# Patient Record
Sex: FEMALE | Race: BLACK OR AFRICAN AMERICAN | NOT HISPANIC OR LATINO | Employment: FULL TIME | URBAN - METROPOLITAN AREA
[De-identification: names, ages, dates, MRNs, and addresses within clinical notes are randomized per-mention and may not be internally consistent; named-entity substitution may affect disease eponyms.]

---
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
## 2022-01-14 ENCOUNTER — HOSPITAL ENCOUNTER (EMERGENCY)
Facility: HOSPITAL | Age: 22
Discharge: HOME | End: 2022-01-15
Attending: EMERGENCY MEDICINE
Payer: COMMERCIAL

## 2022-01-14 ENCOUNTER — APPOINTMENT (EMERGENCY)
Dept: RADIOLOGY | Facility: HOSPITAL | Age: 22
End: 2022-01-14
Payer: COMMERCIAL

## 2022-01-14 DIAGNOSIS — N94.6 SEVERE MENSTRUAL CRAMPS: ICD-10-CM

## 2022-01-14 DIAGNOSIS — R10.30 LOWER ABDOMINAL PAIN: Primary | ICD-10-CM

## 2022-01-14 LAB
ANION GAP SERPL CALCULATED.3IONS-SCNC: 3 MMOL/L (ref 4–13)
BASOPHILS # BLD AUTO: 0.01 THOUSANDS/ΜL (ref 0–0.1)
BASOPHILS NFR BLD AUTO: 0 % (ref 0–1)
BUN SERPL-MCNC: 12 MG/DL (ref 5–25)
CALCIUM SERPL-MCNC: 9.3 MG/DL (ref 8.3–10.1)
CHLORIDE SERPL-SCNC: 108 MMOL/L (ref 100–108)
CO2 SERPL-SCNC: 28 MMOL/L (ref 21–32)
CREAT SERPL-MCNC: 0.89 MG/DL (ref 0.6–1.3)
EOSINOPHIL # BLD AUTO: 0.12 THOUSAND/ΜL (ref 0–0.61)
EOSINOPHIL NFR BLD AUTO: 2 % (ref 0–6)
ERYTHROCYTE [DISTWIDTH] IN BLOOD BY AUTOMATED COUNT: 14.6 % (ref 11.6–15.1)
EXT PREG TEST URINE: NEGATIVE
EXT. CONTROL ED NAV: NORMAL
GFR SERPL CREATININE-BSD FRML MDRD: 94 ML/MIN/1.73SQ M
GLUCOSE SERPL-MCNC: 104 MG/DL (ref 65–140)
HCT VFR BLD AUTO: 35.2 % (ref 34.8–46.1)
HGB BLD-MCNC: 11.2 G/DL (ref 11.5–15.4)
IMM GRANULOCYTES # BLD AUTO: 0.03 THOUSAND/UL (ref 0–0.2)
IMM GRANULOCYTES NFR BLD AUTO: 1 % (ref 0–2)
LYMPHOCYTES # BLD AUTO: 1.86 THOUSANDS/ΜL (ref 0.6–4.47)
LYMPHOCYTES NFR BLD AUTO: 33 % (ref 14–44)
MCH RBC QN AUTO: 26.1 PG (ref 26.8–34.3)
MCHC RBC AUTO-ENTMCNC: 31.8 G/DL (ref 31.4–37.4)
MCV RBC AUTO: 82 FL (ref 82–98)
MONOCYTES # BLD AUTO: 0.49 THOUSAND/ΜL (ref 0.17–1.22)
MONOCYTES NFR BLD AUTO: 9 % (ref 4–12)
NEUTROPHILS # BLD AUTO: 3.15 THOUSANDS/ΜL (ref 1.85–7.62)
NEUTS SEG NFR BLD AUTO: 55 % (ref 43–75)
NRBC BLD AUTO-RTO: 0 /100 WBCS
PLATELET # BLD AUTO: 277 THOUSANDS/UL (ref 149–390)
PMV BLD AUTO: 10.8 FL (ref 8.9–12.7)
POTASSIUM SERPL-SCNC: 3.9 MMOL/L (ref 3.5–5.3)
RBC # BLD AUTO: 4.29 MILLION/UL (ref 3.81–5.12)
SODIUM SERPL-SCNC: 139 MMOL/L (ref 136–145)
WBC # BLD AUTO: 5.66 THOUSAND/UL (ref 4.31–10.16)

## 2022-01-14 PROCEDURE — 74177 CT ABD & PELVIS W/CONTRAST: CPT

## 2022-01-14 PROCEDURE — 81025 URINE PREGNANCY TEST: CPT

## 2022-01-14 PROCEDURE — 99283 EMERGENCY DEPT VISIT LOW MDM: CPT | Performed by: EMERGENCY MEDICINE

## 2022-01-14 PROCEDURE — 85025 COMPLETE CBC W/AUTO DIFF WBC: CPT

## 2022-01-14 PROCEDURE — 99284 EMERGENCY DEPT VISIT MOD MDM: CPT

## 2022-01-14 PROCEDURE — 96374 THER/PROPH/DIAG INJ IV PUSH: CPT

## 2022-01-14 PROCEDURE — 36415 COLL VENOUS BLD VENIPUNCTURE: CPT

## 2022-01-14 PROCEDURE — 80048 BASIC METABOLIC PNL TOTAL CA: CPT

## 2022-01-14 PROCEDURE — G1004 CDSM NDSC: HCPCS

## 2022-01-14 RX ORDER — KETOROLAC TROMETHAMINE 30 MG/ML
15 INJECTION, SOLUTION INTRAMUSCULAR; INTRAVENOUS ONCE
Status: COMPLETED | OUTPATIENT
Start: 2022-01-14 | End: 2022-01-14

## 2022-01-14 RX ADMIN — KETOROLAC TROMETHAMINE 15 MG: 30 INJECTION, SOLUTION INTRAMUSCULAR at 23:13

## 2022-01-15 VITALS
SYSTOLIC BLOOD PRESSURE: 132 MMHG | TEMPERATURE: 97.9 F | WEIGHT: 260 LBS | RESPIRATION RATE: 16 BRPM | HEART RATE: 101 BPM | OXYGEN SATURATION: 98 % | DIASTOLIC BLOOD PRESSURE: 84 MMHG | BODY MASS INDEX: 46.06 KG/M2

## 2022-01-15 RX ADMIN — IOHEXOL 100 ML: 350 INJECTION, SOLUTION INTRAVENOUS at 00:50

## 2022-01-15 NOTE — ED PROVIDER NOTES
History  Chief Complaint   Patient presents with    Abdominal Pain     pt c/o abd pain and severe vaginal bleeding x3 days  24 yo F no significant PMHx present to the ED with lower abdominal pain and heavy vaginal bleeding for 3 days  Patient states that the pain came on suddenly on Wednesday afternoon  The pain is located bilaterally in both lower quadrants L more than R and in the suprapubic region  She states the pain feels like severe menstrual cramps  She has a history of significant menstrual cramps and irregular menstrual periods  She has been on OCPs for 5 years which have controlled her symptoms and so she has not experienced pain like this in around 5 years  She recently stopped taking OCPs 2 months ago due to a lapse in insurance  She is sexually active  The first day of her LMP prior to this episode of bleeding at 12/18/21 so she would be due for her next period now  She denies any history of STDs, no dysuria or urgency, no history of vaginal discharge  She has tried Tylenol and Advil for pain control without relief  She denies any fevers or chills, she endorses some nausea but no vomiting  No diarrhea or constipation, no back pain or flank pain  Prior to Admission Medications   Prescriptions Last Dose Informant Patient Reported? Taking? Viorele 0 15-0 02/0 01 MG (21/5) per tablet 1/14/2022 at Unknown time  No Yes   Sig: TAKE 1 TABLET BY MOUTH DAILY   benzoyl peroxide 5 % gel 1/14/2022 at Unknown time  Yes Yes   cetirizine (ZyrTEC) 10 mg tablet 1/14/2022 at Unknown time  Yes Yes   Sig: Take 10 mg by mouth      Facility-Administered Medications: None       Past Medical History:   Diagnosis Date    Seasonal allergic reaction        History reviewed  No pertinent surgical history  History reviewed  No pertinent family history  I have reviewed and agree with the history as documented      E-Cigarette/Vaping    E-Cigarette Use Never User      E-Cigarette/Vaping Substances    Nicotine No  THC No     CBD No     Flavoring No     Other No     Unknown No      Social History     Tobacco Use    Smoking status: Never Smoker    Smokeless tobacco: Never Used   Vaping Use    Vaping Use: Never used   Substance Use Topics    Alcohol use: Never    Drug use: Never        Review of Systems   Constitutional: Negative for chills and fever  HENT: Negative for ear pain and sore throat  Eyes: Negative for pain and visual disturbance  Respiratory: Negative for cough and shortness of breath  Cardiovascular: Negative for chest pain and palpitations  Gastrointestinal: Positive for abdominal pain and nausea  Negative for diarrhea and vomiting  Genitourinary: Positive for menstrual problem, pelvic pain and vaginal bleeding  Negative for dysuria, flank pain, frequency, hematuria and vaginal discharge  Musculoskeletal: Negative for arthralgias and back pain  Skin: Negative for color change and rash  Neurological: Negative for dizziness, seizures, syncope and headaches  Hematological: Negative for adenopathy  All other systems reviewed and are negative  Physical Exam  ED Triage Vitals [01/14/22 2239]   Temperature Pulse Respirations Blood Pressure SpO2   97 9 °F (36 6 °C) 79 16 129/79 99 %      Temp Source Heart Rate Source Patient Position - Orthostatic VS BP Location FiO2 (%)   Tympanic Monitor Sitting Left arm --      Pain Score       --             Orthostatic Vital Signs  Vitals:    01/14/22 2239 01/15/22 0043   BP: 129/79 132/84   Pulse: 79 101   Patient Position - Orthostatic VS: Sitting Lying       Physical Exam  Vitals and nursing note reviewed  Constitutional:       General: She is not in acute distress  Appearance: She is well-developed  She is obese  She is not ill-appearing or diaphoretic  HENT:      Head: Normocephalic and atraumatic  Eyes:      Conjunctiva/sclera: Conjunctivae normal    Cardiovascular:      Rate and Rhythm: Normal rate and regular rhythm  Heart sounds: No murmur heard  Pulmonary:      Effort: Pulmonary effort is normal  No respiratory distress  Breath sounds: Normal breath sounds  Abdominal:      Palpations: Abdomen is soft  Tenderness: There is abdominal tenderness in the right lower quadrant, suprapubic area and left lower quadrant  There is no right CVA tenderness, left CVA tenderness, guarding or rebound  Negative signs include Matthew's sign, Rovsing's sign and McBurney's sign  Musculoskeletal:      Cervical back: Neck supple  Skin:     General: Skin is warm and dry  Capillary Refill: Capillary refill takes less than 2 seconds  Neurological:      Mental Status: She is alert           ED Medications  Medications   ketorolac (TORADOL) injection 15 mg (15 mg Intravenous Given 1/14/22 2313)   iohexol (OMNIPAQUE) 350 MG/ML injection (SINGLE-DOSE) 100 mL (100 mL Intravenous Given 1/15/22 0050)       Diagnostic Studies  Results Reviewed     Procedure Component Value Units Date/Time    Basic metabolic panel [[id number]]  (Abnormal) Collected: 01/14/22 2313    Lab Status: Final result Specimen: Blood from Arm, Left Updated: 01/14/22 2338     Sodium 139 mmol/L      Potassium 3 9 mmol/L      Chloride 108 mmol/L      CO2 28 mmol/L      ANION GAP 3 mmol/L      BUN 12 mg/dL      Creatinine 0 89 mg/dL      Glucose 104 mg/dL      Calcium 9 3 mg/dL      eGFR 94 ml/min/1 73sq m     Narrative:      [place] guidelines for Chronic Kidney Disease (CKD):     Stage 1 with normal or high GFR (GFR > 90 mL/min/1 73 square meters)    Stage 2 Mild CKD (GFR = 60-89 mL/min/1 73 square meters)    Stage 3A Moderate CKD (GFR = 45-59 mL/min/1 73 square meters)    Stage 3B Moderate CKD (GFR = 30-44 mL/min/1 73 square meters)    Stage 4 Severe CKD (GFR = 15-29 mL/min/1 73 square meters)    Stage 5 End Stage CKD (GFR <15 mL/min/1 73 square meters)  Note: GFR calculation is accurate only with a steady state creatinine    CBC and differential [[id number]]  (Abnormal) Collected: 01/14/22 2313    Lab Status: Final result Specimen: Blood from Arm, Left Updated: 01/14/22 2326     WBC 5 66 Thousand/uL      RBC 4 29 Million/uL      Hemoglobin 11 2 g/dL      Hematocrit 35 2 %      MCV 82 fL      MCH 26 1 pg      MCHC 31 8 g/dL      RDW 14 6 %      MPV 10 8 fL      Platelets 643 Thousands/uL      nRBC 0 /100 WBCs      Neutrophils Relative 55 %      Immat GRANS % 1 %      Lymphocytes Relative 33 %      Monocytes Relative 9 %      Eosinophils Relative 2 %      Basophils Relative 0 %      Neutrophils Absolute 3 15 Thousands/µL      Immature Grans Absolute 0 03 Thousand/uL      Lymphocytes Absolute 1 86 Thousands/µL      Monocytes Absolute 0 49 Thousand/µL      Eosinophils Absolute 0 12 Thousand/µL      Basophils Absolute 0 01 Thousands/µL     POCT pregnancy, urine [[id number]]  (Normal) Resulted: 01/14/22 2250    Lab Status: Final result Updated: 01/14/22 2250     EXT PREG TEST UR (Ref: Negative) negative     Control valid                 CT abdomen pelvis with contrast   Final Result by Ovidio Shelton MD (01/15 0059)      Mild circumferential wall thickening of the urinary bladder which could represent underdistention or cystitis  Correlate with urinalysis  Workstation performed: [id number]               Procedures  Procedures      ED Course                                       MDM  Number of Diagnoses or Management Options  Lower abdominal pain  Severe menstrual cramps  Diagnosis management comments: 26 yo F no significant PMHx present to the ED with lower abdominal pain and heavy vaginal bleeding for 3 days  DDx: pregnancy, ectopic pregnancy, ovarian torsion, ovarian cyst rupture, menstrual cramping  Pregnancy test negative  Will obtain CT scan to rule-out ovarian or abdominal pathology  CT: Mild circumferential wall thickening of the urinary bladder which could represent underdistention or cystitis   Correlate with urinalysis  Discussed results with patient  She denies any frequency, urgency, or dysuria  She stated that she was put on birth control 5 years ago for severe cramps  Discussed that this episode is likely due to menstrual cramps, potentially endometriosis  She will need to follow-up with obgyn  Patient agreeable  Discussed return precautions, for increased heavy vaginal bleeding, new or different quality to pain  Discharged home with obgyn follow-up  Disposition  Final diagnoses:   Lower abdominal pain   Severe menstrual cramps     Time reflects when diagnosis was documented in both MDM as applicable and the Disposition within this note     Time User Action Codes Description Comment    1/15/2022  1:10 AM Fifi Luisito Add [R10 30] Lower abdominal pain     1/15/2022  1:10 AM Fifi Luisito Add [N94 6] Severe menstrual cramps       ED Disposition     ED Disposition Condition Date/Time Comment    Discharge Stable Sat Harshad 15, 2022  1:10 AM Maryuri Pierre discharge to home/self care  Follow-up Information    None         Discharge Medication List as of 1/15/2022  1:11 AM      CONTINUE these medications which have NOT CHANGED    Details   benzoyl peroxide 5 % gel Starting Fri 10/22/2021, Historical Med      cetirizine (ZyrTEC) 10 mg tablet Take 10 mg by mouth, Historical Med      Viorele 0 15-0 02/0 01 MG (21/5) per tablet TAKE 1 TABLET BY MOUTH DAILY, Normal               PDMP Review     None           ED Provider  Attending physically available and evaluated Maryuri Pierre  I managed the patient along with the ED Attending      Electronically Signed by         Petrona French MD  01/15/22 3905

## 2022-01-15 NOTE — DISCHARGE INSTRUCTIONS
You have been evaluated in the Emergency Department today for abdominal pain  Your evaluation was not suggestive of any emergent condition requiring medical intervention at this time  However, some abdominal problems make take more time to appear  Therefore, it is important for you to watch for any new symptoms or worsening of your current condition  Please follow up with your primary care physician as needed  If you do not have a primary doctor, you can call your insurance company to find one  If you do not have insurance, you can go to the finance/registration department for more assistance  ? Return to the Emergency Department if you experience worsening pain, persistent fevers greater than 100 4, recurrent vomiting, blood in vomit, blood in stool, dark tarry stool, chest pain, difficulty breathing, or any other concerning symptoms

## 2022-01-16 NOTE — ED ATTENDING ATTESTATION
1/14/2022  IEliel MD, saw and evaluated the patient  I have discussed the patient with the resident/non-physician practitioner and agree with the resident's/non-physician practitioner's findings, Plan of Care, and MDM as documented in the resident's/non-physician practitioner's note, except where noted  All available labs and Radiology studies were reviewed  I was present for key portions of any procedure(s) performed by the resident/non-physician practitioner and I was immediately available to provide assistance  At this point I agree with the current assessment done in the Emergency Department  I have conducted an independent evaluation of this patient a history and physical is as follows:    ED Course     Patient is 19-year-old female left lower quadrant abdominal pain vaginal bleeding  Denies fevers chills nausea vomiting  Pregnancy test negative  Abdomen soft nontender nondistended normal bowel sounds and signs of peritonitis  No CVA tenderness  Pelvic deferred    Impression:  Lower quadrant abdominal pain vaginal bleeding  Differential includes ovarian pathology, renal colic, ureteral colic  Plan check labs pain control CTAP         Critical Care Time  Procedures

## 2022-02-11 ENCOUNTER — HOSPITAL ENCOUNTER (EMERGENCY)
Facility: HOSPITAL | Age: 22
Discharge: HOME | End: 2022-02-11
Attending: EMERGENCY MEDICINE
Payer: COMMERCIAL

## 2022-02-11 VITALS
RESPIRATION RATE: 18 BRPM | OXYGEN SATURATION: 95 % | DIASTOLIC BLOOD PRESSURE: 72 MMHG | TEMPERATURE: 98.2 F | HEART RATE: 71 BPM | WEIGHT: 260 LBS | SYSTOLIC BLOOD PRESSURE: 122 MMHG | BODY MASS INDEX: 46.06 KG/M2

## 2022-02-11 DIAGNOSIS — N94.6 MENSTRUAL CRAMPS: ICD-10-CM

## 2022-02-11 DIAGNOSIS — R11.2 NAUSEA AND VOMITING: Primary | ICD-10-CM

## 2022-02-11 LAB
BACTERIA UR QL AUTO: ABNORMAL /HPF
BILIRUB UR QL STRIP: NEGATIVE
CLARITY UR: ABNORMAL
COLOR UR: ABNORMAL
GLUCOSE UR STRIP-MCNC: NEGATIVE MG/DL
HGB UR QL STRIP.AUTO: ABNORMAL
HYALINE CASTS #/AREA URNS LPF: ABNORMAL /LPF
KETONES UR STRIP-MCNC: NEGATIVE MG/DL
LEUKOCYTE ESTERASE UR QL STRIP: ABNORMAL
NITRITE UR QL STRIP: NEGATIVE
NON-SQ EPI CELLS URNS QL MICRO: ABNORMAL /HPF
PH UR STRIP.AUTO: 6 [PH]
PROT UR STRIP-MCNC: ABNORMAL MG/DL
RBC #/AREA URNS AUTO: ABNORMAL /HPF
SP GR UR STRIP.AUTO: 1.02 (ref 1–1.03)
UROBILINOGEN UR QL STRIP.AUTO: 0.2 E.U./DL
WBC #/AREA URNS AUTO: ABNORMAL /HPF

## 2022-02-11 PROCEDURE — 99284 EMERGENCY DEPT VISIT MOD MDM: CPT | Performed by: EMERGENCY MEDICINE

## 2022-02-11 PROCEDURE — 81001 URINALYSIS AUTO W/SCOPE: CPT | Performed by: EMERGENCY MEDICINE

## 2022-02-11 PROCEDURE — 99284 EMERGENCY DEPT VISIT MOD MDM: CPT

## 2022-02-11 RX ORDER — ONDANSETRON 4 MG/1
4 TABLET, ORALLY DISINTEGRATING ORAL ONCE
Status: DISCONTINUED | OUTPATIENT
Start: 2022-02-11 | End: 2022-02-11 | Stop reason: HOSPADM

## 2022-02-11 RX ORDER — ONDANSETRON 4 MG/1
4 TABLET, FILM COATED ORAL EVERY 6 HOURS
Qty: 12 TABLET | Refills: 0 | Status: SHIPPED | OUTPATIENT
Start: 2022-02-11

## 2022-02-11 RX ORDER — KETOROLAC TROMETHAMINE 30 MG/ML
15 INJECTION, SOLUTION INTRAMUSCULAR; INTRAVENOUS ONCE
Status: DISCONTINUED | OUTPATIENT
Start: 2022-02-11 | End: 2022-02-11 | Stop reason: HOSPADM

## 2022-02-11 NOTE — Clinical Note
Juan Manuel Bauman was seen and treated in our emergency department on 2/11/2022  Diagnosis:     Sarah Patton  is off the rest of the shift today  She may return on this date: If you have any questions or concerns, please don't hesitate to call        Hardeep Riojas DO    ______________________________           _______________          _______________  Hospital Representative                              Date                                Time

## 2022-02-11 NOTE — DISCHARGE INSTRUCTIONS
Zofran was sent to your pharmacy for nausea and vomiting  Please only take when needed as prescribed  Contact information for local ob/gyn provided  Please make first available appointment

## 2022-02-11 NOTE — ED ATTENDING ATTESTATION
Final Diagnosis:  1  Nausea and vomiting    2  Menstrual cramps           IReba MD, saw and evaluated the patient  All available labs and X-rays were ordered by me or the resident and have been reviewed by myself  I discussed the patient with the resident / non-physician and agree with the resident's / non-physician practitioner's findings and plan as documented in the resident's / non-physician practicitioner's note, except where noted  At this point, I agree with the current assessment done in the ED  I was present during key portions of all procedures performed unless otherwise stated  Chief Complaint   Patient presents with    Abdominal Pain     pt was seen 1 mo ago for abd cramping  pt was to [facility] outpatient tests for endometriosis but has not had time  yesterday had painful cramps with vomiting at work  This is a 25 y o  female presenting for evaluation of belly pain, menstrual cramping  Here a month ago for same  She was supposed to f/u with OBGYN for endometritis  She started to have vomiting at work and the same pain with her menses today, so was sent in for evaluation  NBNB emesis x1  Normal vaginal bleeding  No systemic signs  No fevers  No diarrhea  Denies any urinary tract infection symptoms (burning, itching, pain, blood, frequency)  No sexual activity  She is here because her work told her to come in for it primarily  PMH:   has a past medical history of Seasonal allergic reaction  PSH:   has no past surgical history on file      Social:  Social History     Substance and Sexual Activity   Alcohol Use Never     Social History     Tobacco Use   Smoking Status Never Smoker   Smokeless Tobacco Never Used     Social History     Substance and Sexual Activity   Drug Use Never     PE:  Vitals:    02/11/22 1606   BP: 122/72   BP Location: Right arm   Pulse: 71   Resp: 18   Temp: 98 2 °F (36 8 °C)   TempSrc: Tympanic   SpO2: 95%   Weight: 118 kg (260 lb)   General: VSS, NAD, awake, alert  Well-nourished, well-developed  Appears stated age  Head: Normocephalic, atraumatic, nontender  Eyes: PERRL, EOM-I  No diplopia  No hyphema  No subconjunctival hemorrhages  Symmetrical lids  ENTAtraumatic external nose and ears  MMM  No stridor  Normal phonation  No drooling  Base of mouth is soft  No mastoid tenderness  Neck: Symmetric, trachea midline  No JVD  CV: Peripheral pulses +2 throughout  No chest wall tenderness  Lungs:   Unlabored   No retractions  No crepitus  No tachypnea  No paradoxical motion  Abd: +BS, soft, minimal lower belly tenderness  ND  Lateral abdominal tenderness (not flank), bilaterally, mildly  MSK:   FROM   No lower extremity edema  Back:   No CVAT  Skin: Dry, intact  Neuro: AAOx3, GCS 15, CN II-XII grossly intact  Motor grossly intact  Psychiatric/Behavioral: Appropriate mood and affect   Exam: deferred  A:  - Menstrual cramps   P:  - Toradol  - Urine  - REe-lena     - 13 point ROS was performed and all are normal unless stated in the history above  - Nursing note reviewed  Vitals reviewed  - Orders placed by myself and/or advanced practitioner / resident     - Previous chart was reviewed  - No language barrier    - History obtained from patient  - There are no limitations to the history obtained  - Critical care time: Not applicable for this patient       Code Status: No Order  Advance Directive and Living Will:      Power of :    POLST:      Medications - No data to display    No orders to display     Orders Placed This Encounter   Procedures    UA w Reflex to Microscopic w Reflex to Culture    Urine Microscopic     Labs Reviewed   UA W REFLEX TO MICROSCOPIC WITH REFLEX TO CULTURE - Abnormal       Result Value Ref Range Status    Color, UA Pink   Final    Clarity, UA Cloudy   Final    Specific Gravity, UA 1 025  1 003 - 1 030 Final    pH, UA 6 0  4 5, 5 0, 5 5, 6 0, 6 5, 7 0, 7 5, 8 0 Final Leukocytes, UA Trace (*) Negative Final    Nitrite, UA Negative  Negative Final    Protein, UA 30 (1+) (*) Negative mg/dl Final    Glucose, UA Negative  Negative mg/dl Final    Ketones, UA Negative  Negative mg/dl Final    Urobilinogen, UA 0 2  0 2, 1 0 E U /dl E U /dl Final    Bilirubin, UA Negative  Negative Final    Blood, UA Large   Final   URINE MICROSCOPIC - Abnormal    RBC, UA Innumerable (*) None Seen, 2-4 /hpf Final    WBC, UA 4-10 (*) None Seen, 2-4, 5-60 /hpf Final    Epithelial Cells None Seen  None Seen, Occasional /hpf Final    Bacteria, UA None Seen  None Seen, Occasional /hpf Final    Hyaline Casts, UA None Seen  None Seen /lpf Final     Time reflects when diagnosis was documented in both MDM as applicable and the Disposition within this note       Time User Action Codes Description Comment    2/11/2022  4:44 PM Kayden Ibrahim Add [R11 2] Nausea and vomiting     2/11/2022  4:44 PM Arya Dickerson Add [N94 6] Menstrual cramps           ED Disposition       ED Disposition Condition Date/Time Comment    Discharge Stable Fri Feb 11, 2022  5:57 PM Pasqual Grandchild discharge to home/self care                  Follow-up Information       Follow up With Specialties Details Why Contact Info Additional [address] [facility] Obstetrics and Gynecology   [place] [id number] [facility] [address] [phone]  [address], [facility] [address]          Discharge Medication List as of 2/11/2022  5:58 PM        START taking these medications    Details   ondansetron (ZOFRAN) 4 mg tablet Take 1 tablet (4 mg total) by mouth every 6 (six) hours, Starting Fri 2/11/2022, Normal           CONTINUE these medications which have NOT CHANGED    Details   benzoyl peroxide 5 % gel Starting Fri 10/22/2021, Historical Med      cetirizine (ZyrTEC) 10 mg tablet Take 10 mg by mouth, Historical Med Viorele 0 15-0 02/0 01 MG (21/5) per tablet TAKE 1 TABLET BY MOUTH DAILY, Normal           No discharge procedures on file  Prior to Admission Medications   Prescriptions Last Dose Informant Patient Reported? Taking? Viorele 0 15-0 02/0 01 MG (21/5) per tablet   No No   Sig: TAKE 1 TABLET BY MOUTH DAILY   benzoyl peroxide 5 % gel   Yes No   cetirizine (ZyrTEC) 10 mg tablet   Yes No   Sig: Take 10 mg by mouth      Facility-Administered Medications: None       Portions of the record may have been created with voice recognition software  Occasional wrong word or "sound a like" substitutions may have occurred due to the inherent limitations of voice recognition software  Read the chart carefully and recognize, using context, where substitutions have occurred      Electronically signed by:  Chema Darden

## 2022-02-11 NOTE — ED PROVIDER NOTES
History  Chief Complaint   Patient presents with    Abdominal Pain     pt was seen 1 mo ago for abd cramping  pt was to scedule outpatient tests for endometriosis but has not had time  yesterday had painful cramps with vomiting at work  79-year-old female history of menstrual cramps, presenting due to menstrual cramps and nausea  Patient states she was seen here around a month ago with similar presentation and had a negative workup that included blood work and imaging  Said she was scheduled to see an OBGYN but has not yet had an opportunity due to insurance issues  Says she is on her menstrual cycle at this time and has similar symptoms and was told by her employer to be evaluated by a physician at this time  Says she has mild abdominal cramping an episode of nausea vomiting  States she is not currently sexually active  Denies any fever, chills, chest pain or dyspnea, urinary or bowel symptoms, vaginal discharge or pain  Prior to Admission Medications   Prescriptions Last Dose Informant Patient Reported? Taking? Viorele 0 15-0 02/0 01 MG (21/5) per tablet   No No   Sig: TAKE 1 TABLET BY MOUTH DAILY   benzoyl peroxide 5 % gel   Yes No   cetirizine (ZyrTEC) 10 mg tablet   Yes No   Sig: Take 10 mg by mouth      Facility-Administered Medications: None       Past Medical History:   Diagnosis Date    Seasonal allergic reaction        History reviewed  No pertinent surgical history  History reviewed  No pertinent family history  I have reviewed and agree with the history as documented      E-Cigarette/Vaping    E-Cigarette Use Never User      E-Cigarette/Vaping Substances    Nicotine No     THC No     CBD No     Flavoring No     Other No     Unknown No      Social History     Tobacco Use    Smoking status: Never Smoker    Smokeless tobacco: Never Used   Vaping Use    Vaping Use: Never used   Substance Use Topics    Alcohol use: Never    Drug use: Never        Review of Systems Constitutional: Negative for activity change, appetite change, chills, fatigue and fever  HENT: Negative for congestion and rhinorrhea  Eyes: Negative for visual disturbance  Respiratory: Negative for cough, chest tightness, shortness of breath and wheezing  Cardiovascular: Negative for chest pain, palpitations and leg swelling  Gastrointestinal: Positive for abdominal pain, nausea and vomiting  Negative for diarrhea  Genitourinary: Negative for flank pain  Musculoskeletal: Negative for arthralgias and myalgias  Skin: Negative for rash and wound  Neurological: Negative for syncope, weakness and headaches  All other systems reviewed and are negative  Physical Exam  ED Triage Vitals [02/11/22 1606]   Temperature Pulse Respirations Blood Pressure SpO2   98 2 °F (36 8 °C) 71 18 122/72 95 %      Temp Source Heart Rate Source Patient Position - Orthostatic VS BP Location FiO2 (%)   Tympanic Monitor Sitting Right arm --      Pain Score       6             Orthostatic Vital Signs  Vitals:    02/11/22 1606   BP: 122/72   Pulse: 71   Patient Position - Orthostatic VS: Sitting       Physical Exam  Vitals and nursing note reviewed  Constitutional:       General: She is not in acute distress  Appearance: She is well-developed  She is not diaphoretic  HENT:      Head: Normocephalic and atraumatic  Right Ear: External ear normal       Left Ear: External ear normal    Eyes:      General: No scleral icterus  Right eye: No discharge  Left eye: No discharge  Conjunctiva/sclera: Conjunctivae normal    Neck:      Vascular: No JVD  Trachea: No tracheal deviation  Cardiovascular:      Rate and Rhythm: Normal rate and regular rhythm  Heart sounds: Normal heart sounds  Pulmonary:      Effort: Pulmonary effort is normal  No respiratory distress  Breath sounds: Normal breath sounds  No stridor  No wheezing or rales     Abdominal:      General: There is no distension  Musculoskeletal:         General: No tenderness or deformity  Normal range of motion  Cervical back: Normal range of motion  Skin:     General: Skin is warm  Findings: No erythema or rash  Neurological:      Mental Status: She is alert and oriented to person, place, and time  Motor: No abnormal muscle tone  Psychiatric:         Mood and Affect: Mood normal          Behavior: Behavior normal          Thought Content: Thought content normal          ED Medications  Medications - No data to display    Diagnostic Studies  Results Reviewed     Procedure Component Value Units Date/Time    Urine Microscopic [[id number]]  (Abnormal) Resulted: 02/11/22 1743    Lab Status: Final result Specimen: Urine Updated: 02/11/22 1743     RBC, UA Innumerable /hpf      WBC, UA 4-10 /hpf      Epithelial Cells None Seen /hpf      Bacteria, UA None Seen /hpf      Hyaline Casts, UA None Seen /lpf     UA w Reflex to Microscopic w Reflex to Culture [[id number]]  (Abnormal) Resulted: 02/11/22 1743    Lab Status: Final result Specimen: Urine Updated: 02/11/22 1743     Color, UA Pink     Clarity, UA Cloudy     Specific Gravity, UA 1 025     pH, UA 6 0     Leukocytes, UA Trace     Nitrite, UA Negative     Protein, UA 30 (1+) mg/dl      Glucose, UA Negative mg/dl      Ketones, UA Negative mg/dl      Urobilinogen, UA 0 2 E U /dl      Bilirubin, UA Negative     Blood, UA Large                 No orders to display         Procedures  Procedures      ED Course                                       MDM  Number of Diagnoses or Management Options  Menstrual cramps  Nausea and vomiting  Diagnosis management comments: Well-appearing on exam, benign abdomen  Patient provided work note per her request and provided contact information for local OBGYN  Provided Toradol with improvement of symptoms    Discharged in stable condition with return precautions      Disposition  Final diagnoses:   Nausea and vomiting   Menstrual cramps Time reflects when diagnosis was documented in both MDM as applicable and the Disposition within this note     Time User Action Codes Description Comment    2/11/2022  4:44 PM Paster, Tammie Bamberger Add [R11 2] Nausea and vomiting     2/11/2022  4:44 PM Kaylan Calderóns Add [N94 6] Menstrual cramps       ED Disposition     ED Disposition Condition Date/Time Comment    Discharge Stable Fri Feb 11, 2022  5:57 PM Mearl Miu discharge to home/self care  Follow-up Information     Follow up With Specialties Details Why Contact Info Additional [address] Obstetrics and Gynecology   [facility] [address] [facility] [address] [phone]  [facility] [address]          Discharge Medication List as of 2/11/2022  5:58 PM      START taking these medications    Details   ondansetron (ZOFRAN) 4 mg tablet Take 1 tablet (4 mg total) by mouth every 6 (six) hours, Starting Fri 2/11/2022, Normal         CONTINUE these medications which have NOT CHANGED    Details   benzoyl peroxide 5 % gel Starting Fri 10/22/2021, Historical Med      cetirizine (ZyrTEC) 10 mg tablet Take 10 mg by mouth, Historical Med      Viorele 0 15-0 02/0 01 MG (21/5) per tablet TAKE 1 TABLET BY MOUTH DAILY, Normal           No discharge procedures on file  PDMP Review     None           ED Provider  Attending physically available and evaluated Mearl Miu  I managed the patient along with the ED Attending      Electronically Signed by         Yogesh Gardiner DO  02/13/22 0130

## 2022-03-14 ENCOUNTER — TELEPHONE (OUTPATIENT)
Dept: OBGYN CLINIC | Facility: CLINIC | Age: 22
End: 2022-03-14

## 2022-04-05 ENCOUNTER — OFFICE VISIT (OUTPATIENT)
Dept: OBGYN CLINIC | Facility: CLINIC | Age: 22
End: 2022-04-05
Payer: COMMERCIAL

## 2022-04-05 VITALS
HEIGHT: 64 IN | WEIGHT: 260 LBS | SYSTOLIC BLOOD PRESSURE: 140 MMHG | DIASTOLIC BLOOD PRESSURE: 80 MMHG | BODY MASS INDEX: 44.39 KG/M2

## 2022-04-05 DIAGNOSIS — N94.6 DYSMENORRHEA: Primary | ICD-10-CM

## 2022-04-05 DIAGNOSIS — Z30.41 ENCOUNTER FOR SURVEILLANCE OF CONTRACEPTIVE PILLS: ICD-10-CM

## 2022-04-05 PROCEDURE — 99213 OFFICE O/P EST LOW 20 MIN: CPT | Performed by: NURSE PRACTITIONER

## 2022-04-05 RX ORDER — LORATADINE 10 MG/1
CAPSULE, LIQUID FILLED ORAL
COMMUNITY

## 2022-04-05 RX ORDER — LEVONORGESTREL AND ETHINYL ESTRADIOL 0.15-0.03
1 KIT ORAL DAILY
Qty: 90 TABLET | Refills: 0 | Status: SHIPPED | OUTPATIENT
Start: 2022-04-05

## 2022-04-05 NOTE — PROGRESS NOTES
Subjective      Brie Fuentes is a 25 y o  female who presents to re-initiate birth control  She was on OCP for dymenorrhea that was prescribed by another provider  She discontinued the OCP in November due to not having access to her refills  Since January she has been to the ER monthly with severe menstrual cramps  The patient is sexually active  Pertinent past medical history: none  Menstrual History:    OB History        0    Para   0    Term   0       0    AB   0    Living   0       SAB   0    IAB   0    Ectopic   0    Multiple   0    Live Births   0                  Patient's last menstrual period was 2022  The following portions of the patient's history were reviewed and updated as appropriate: allergies, current medications, past family history, past medical history, past social history, past surgical history and problem list     Review of Systems  Pertinent items are noted in HPI  Objective      /80   Ht 5' 4" (1 626 m)   Wt 118 kg (260 lb)   LMP 2022   BMI 44 63 kg/m²       Assessment and Plan    Sander Polanco was seen today for follow-up  Diagnoses and all orders for this visit:    Dysmenorrhea  -     levonorgestrel-ethinyl estradiol (Iclevia) 0 15-0 03 MG per tablet; Take 1 tablet by mouth daily    Encounter for surveillance of contraceptive pills  -     levonorgestrel-ethinyl estradiol (Iclevia) 0 15-0 03 MG per tablet; Take 1 tablet by mouth daily           25 y o , restarting OCP (estrogen/progesterone), no contraindications  Follow up in 2 months for BP check and OCP follow-up

## 2022-05-16 ENCOUNTER — HOSPITAL ENCOUNTER (EMERGENCY)
Facility: HOSPITAL | Age: 22
Discharge: HOME | End: 2022-05-16
Attending: EMERGENCY MEDICINE
Payer: COMMERCIAL

## 2022-05-16 VITALS
DIASTOLIC BLOOD PRESSURE: 119 MMHG | HEART RATE: 70 BPM | SYSTOLIC BLOOD PRESSURE: 179 MMHG | RESPIRATION RATE: 18 BRPM | TEMPERATURE: 97.5 F | OXYGEN SATURATION: 96 %

## 2022-05-16 DIAGNOSIS — N94.6 MENSTRUAL CRAMP: Primary | ICD-10-CM

## 2022-05-16 LAB
BACTERIA UR QL AUTO: ABNORMAL /HPF
BILIRUB UR QL STRIP: NEGATIVE
CLARITY UR: ABNORMAL
COLOR UR: ABNORMAL
GLUCOSE UR STRIP-MCNC: NEGATIVE MG/DL
HGB UR QL STRIP.AUTO: ABNORMAL
KETONES UR STRIP-MCNC: NEGATIVE MG/DL
LEUKOCYTE ESTERASE UR QL STRIP: ABNORMAL
MUCOUS THREADS UR QL AUTO: ABNORMAL
NITRITE UR QL STRIP: NEGATIVE
NON-SQ EPI CELLS URNS QL MICRO: ABNORMAL /HPF
PH UR STRIP.AUTO: 7.5 [PH]
PROT UR STRIP-MCNC: ABNORMAL MG/DL
RBC #/AREA URNS AUTO: ABNORMAL /HPF
SP GR UR STRIP.AUTO: 1.02 (ref 1–1.03)
UROBILINOGEN UR STRIP-ACNC: <2 MG/DL
WBC #/AREA URNS AUTO: ABNORMAL /HPF

## 2022-05-16 PROCEDURE — 87147 CULTURE TYPE IMMUNOLOGIC: CPT

## 2022-05-16 PROCEDURE — 81001 URINALYSIS AUTO W/SCOPE: CPT

## 2022-05-16 PROCEDURE — 99284 EMERGENCY DEPT VISIT MOD MDM: CPT

## 2022-05-16 PROCEDURE — 87086 URINE CULTURE/COLONY COUNT: CPT

## 2022-05-16 RX ORDER — IBUPROFEN 600 MG/1
600 TABLET ORAL EVERY 6 HOURS PRN
Qty: 30 TABLET | Refills: 0 | Status: SHIPPED | OUTPATIENT
Start: 2022-05-16

## 2022-05-16 RX ORDER — ONDANSETRON 4 MG/1
4 TABLET, ORALLY DISINTEGRATING ORAL EVERY 6 HOURS PRN
Qty: 20 TABLET | Refills: 0 | Status: SHIPPED | OUTPATIENT
Start: 2022-05-16

## 2022-05-16 RX ORDER — IBUPROFEN 600 MG/1
600 TABLET ORAL ONCE
Status: COMPLETED | OUTPATIENT
Start: 2022-05-16 | End: 2022-05-16

## 2022-05-16 RX ORDER — ONDANSETRON 4 MG/1
4 TABLET, ORALLY DISINTEGRATING ORAL ONCE
Status: COMPLETED | OUTPATIENT
Start: 2022-05-16 | End: 2022-05-16

## 2022-05-16 RX ADMIN — IBUPROFEN 600 MG: 600 TABLET, FILM COATED ORAL at 15:40

## 2022-05-16 RX ADMIN — ONDANSETRON 4 MG: 4 TABLET, ORALLY DISINTEGRATING ORAL at 15:41

## 2022-05-16 NOTE — ED NOTES
Still waiting for pts urine sample at this time, pt has been drinking water to help        Maribel Perez  05/16/22 7826

## 2022-05-16 NOTE — Clinical Note
Vandana Momin was seen and treated in our emergency department on 5/16/2022  No restrictions            Diagnosis:     [place]    She may return on this date: If you have any questions or concerns, please don't hesitate to call        Blanca Kaur PA-C    ______________________________           _______________          _______________  Hospital Representative                              Date                                Time

## 2022-05-16 NOTE — Clinical Note
Erika Khoury was seen and treated in our emergency department on 5/16/2022  No restrictions            Diagnosis:     Lit    She may return on this date: If you have any questions or concerns, please don't hesitate to call        Anna Mackenzie PA-C    ______________________________           _______________          _______________  Hospital Representative                              Date                                Time

## 2022-05-16 NOTE — ED PROVIDER NOTES
History  Chief Complaint   Patient presents with    Abdominal Cramping     Pt c/o lower abdominal cramping while menstruating  Pt also reports nausea and vomiting d/t cramping     Patient Tuesday to go to a code blue for suprapubic of the little bit, nausea and vomiting  Patient has been seen for this in the past, and she states that there was a workup done, and was told that it was menstrual cramping  Patient has recently been to see OBGYN, started on birth control, however this is not helped  Patient states that she isn't to take medication earlier for the pain, and up throwing it up    Patient is not actively vomiting when I went to go talk to her  Patient is in no acute distress at this time      History provided by:  Patient   used: No    Abdominal Cramping  Pain location:  Suprapubic  Pain severity:  Mild  Associated symptoms: nausea and vaginal bleeding    Associated symptoms: no chest pain, no chills, no dysuria, no fatigue, no fever, no flatus, no hematuria, no shortness of breath and no vomiting        Prior to Admission Medications   Prescriptions Last Dose Informant Patient Reported? Taking? Loratadine (Claritin) 10 MG CAPS   Yes No   Sig: Take by mouth   benzoyl peroxide 5 % gel   Yes No   levonorgestrel-ethinyl estradiol (Iclevia) 0 15-0 03 MG per tablet   No No   Sig: Take 1 tablet by mouth daily   ondansetron (ZOFRAN) 4 mg tablet   No No   Sig: Take 1 tablet (4 mg total) by mouth every 6 (six) hours      Facility-Administered Medications: None       Past Medical History:   Diagnosis Date    Anemia     Seasonal allergic reaction        History reviewed  No pertinent surgical history  History reviewed  No pertinent family history  I have reviewed and agree with the history as documented      E-Cigarette/Vaping    E-Cigarette Use Never User      E-Cigarette/Vaping Substances    Nicotine No     THC No     CBD No     Flavoring No     Other No     Unknown No Social History     Tobacco Use    Smoking status: Never Smoker    Smokeless tobacco: Never Used   Vaping Use    Vaping Use: Never used   Substance Use Topics    Alcohol use: Never    Drug use: Never       Review of Systems   Constitutional: Negative  Negative for chills, fatigue and fever  HENT: Negative  Eyes: Negative  Respiratory: Negative  Negative for shortness of breath  Cardiovascular: Negative  Negative for chest pain  Gastrointestinal: Positive for nausea  Negative for flatus and vomiting  Genitourinary: Positive for vaginal bleeding  Negative for dysuria and hematuria  Musculoskeletal: Negative  Skin: Negative  Neurological: Negative  Psychiatric/Behavioral: Negative  Physical Exam  Physical Exam  Vitals reviewed  Constitutional:       Appearance: Normal appearance  She is normal weight  HENT:      Head: Normocephalic and atraumatic  Right Ear: External ear normal       Left Ear: External ear normal       Nose: Nose normal    Eyes:      Conjunctiva/sclera: Conjunctivae normal    Cardiovascular:      Rate and Rhythm: Normal rate  Pulmonary:      Effort: Pulmonary effort is normal    Abdominal:      Palpations: Abdomen is soft  Tenderness: There is abdominal tenderness  There is no guarding  Musculoskeletal:         General: Normal range of motion  Cervical back: Normal range of motion  Skin:     General: Skin is warm and dry  Neurological:      Mental Status: She is alert           Vital Signs  ED Triage Vitals [05/16/22 1518]   Temperature Pulse Respirations Blood Pressure SpO2   97 5 °F (36 4 °C) 70 18 (!) 179/119 96 %      Temp Source Heart Rate Source Patient Position - Orthostatic VS BP Location FiO2 (%)   Oral Monitor Lying Right arm --      Pain Score       7           Vitals:    05/16/22 1518   BP: (!) 179/119   Pulse: 70   Patient Position - Orthostatic VS: Lying         Visual Acuity      ED Medications  Medications ondansetron (ZOFRAN-ODT) dispersible tablet 4 mg (4 mg Oral Given 5/16/22 1541)   ibuprofen (MOTRIN) tablet 600 mg (600 mg Oral Given 5/16/22 1540)       Diagnostic Studies  Results Reviewed     Procedure Component Value Units Date/Time    UA w Reflex to Microscopic w Reflex to Culture [[id number]] Collected: 05/16/22 1624    Lab Status: In process Specimen: Urine, Clean Catch Updated: 05/16/22 1632                 No orders to display              Procedures  Procedures         ED Course  ED Course as of 05/16/22 1635   Mon May 16, 2022   1624 Patient states that she is feeling better, and her nausea is completely gone at this point  I spoke with patient, and informed tried a low suspicion for UTI at this point as cause for symptoms and most likely to be her menstrual pain  Patient is okay with being discharged with prescription for Zofran         CRAFFT    Flowsheet Row Most Recent Value   SBIRT (13-23 yo)    In order to provide better care to our patients, we are screening all of our patients for alcohol and drug use  Would it be okay to ask you these screening questions? Yes Filed at: 05/16/2022 1534   ROBIT Initial Screen: During the past 12 months, did you:    1  Drink any alcohol (more than a few sips)? No Filed at: 05/16/2022 1534   2  Smoke any marijuana or hashish No Filed at: 05/16/2022 1534   3  Use anything else to get high? ("anything else" includes illegal drugs, over the counter and prescription drugs, and things that you sniff or 'khoury')? No Filed at: 05/16/2022 1534                                          MDM  Number of Diagnoses or Management Options  Menstrual cramp: new and does not require workup  Diagnosis management comments: Patient is a 16-year-old female with known history of severe menstrual cramps  Patient felt better after Zofran hand emergency room, and ibuprofen    Patient was given prescriptions and discharged home    Counseling: I had a detailed discussion with the patient and/or guardian regarding: the historical points, exam findings, and any diagnostic results supporting the discharge diagnosis, lab results, radiology results, discharge instructions reviewed with patient and/or family/caregiver and understanding was verbalized  Instructions given to return to the emergency department if symptoms worsen or persist, or if there are any questions or concerns that arise at home       All labs reviewed and utilized in the medical decision making process     All radiology studies independently viewed by me and interpreted by the radiologist     Portions of the record may have been created with voice recognition software   Occasional wrong word or "sound a like" substitutions may have occurred due to the inherent limitations of voice recognition software   Read the chart carefully and recognize, using context, where substitutions have occurred  Risk of Complications, Morbidity, and/or Mortality  Presenting problems: minimal  Diagnostic procedures: minimal  Management options: minimal    Patient Progress  Patient progress: stable      Disposition  Final diagnoses:   Menstrual cramp     Time reflects when diagnosis was documented in both MDM as applicable and the Disposition within this note     Time User Action Codes Description Comment    5/16/2022  4:29 PM Brennen Lopez Add [N94 6] Menstrual cramp       ED Disposition     ED Disposition   Discharge    Condition   Stable    Date/Time   Mon May 16, 2022  4:29 PM    Comment   Kp Mcrae discharge to home/self care                 Follow-up Information     Follow up With Specialties Details Why Contact Info Additional Dwight 22, DO Family Medicine   [address] [facility] [id number]  [address] [facility] Emergency Department Emergency Medicine  As needed, If symptoms worsen [address] [place] [address] Department, [address] [place] [address], [id number]   [phone]          Discharge Medication List as of 5/16/2022  4:33 PM      START taking these medications    Details   ibuprofen (MOTRIN) 600 mg tablet Take 1 tablet (600 mg total) by mouth every 6 (six) hours as needed for mild pain, Starting Mon 5/16/2022, Normal      ondansetron (Zofran ODT) 4 mg disintegrating tablet Take 1 tablet (4 mg total) by mouth every 6 (six) hours as needed for nausea or vomiting, Starting Mon 5/16/2022, Normal         CONTINUE these medications which have NOT CHANGED    Details   benzoyl peroxide 5 % gel Starting Fri 10/22/2021, Historical Med      levonorgestrel-ethinyl estradiol (Iclevia) 0 15-0 03 MG per tablet Take 1 tablet by mouth daily, Starting Tue 4/5/2022, Normal      Loratadine (Claritin) 10 MG CAPS Take by mouth, Historical Med      ondansetron (ZOFRAN) 4 mg tablet Take 1 tablet (4 mg total) by mouth every 6 (six) hours, Starting Fri 2/11/2022, Normal             No discharge procedures on file      PDMP Review     None          ED Provider  Electronically Signed by           Blanca Kaur PA-C  05/16/22 9459

## 2022-05-18 LAB
BACTERIA UR CULT: ABNORMAL
BACTERIA UR CULT: ABNORMAL

## 2022-07-18 ENCOUNTER — HOSPITAL ENCOUNTER (EMERGENCY)
Facility: HOSPITAL | Age: 22
Discharge: HOME | End: 2022-07-18
Attending: EMERGENCY MEDICINE
Payer: COMMERCIAL

## 2022-07-18 VITALS
DIASTOLIC BLOOD PRESSURE: 97 MMHG | OXYGEN SATURATION: 100 % | BODY MASS INDEX: 45.49 KG/M2 | HEART RATE: 59 BPM | TEMPERATURE: 97.9 F | RESPIRATION RATE: 18 BRPM | SYSTOLIC BLOOD PRESSURE: 159 MMHG | WEIGHT: 265 LBS

## 2022-07-18 DIAGNOSIS — N94.6 MENSTRUAL CRAMPS: Primary | ICD-10-CM

## 2022-07-18 DIAGNOSIS — R11.2 NAUSEA AND VOMITING IN ADULT: ICD-10-CM

## 2022-07-18 DIAGNOSIS — R10.9 ABDOMINAL CRAMPING: ICD-10-CM

## 2022-07-18 LAB
ANION GAP SERPL CALCULATED.3IONS-SCNC: 1 MMOL/L (ref 4–13)
ANISOCYTOSIS BLD QL SMEAR: PRESENT
BASOPHILS # BLD MANUAL: 0 THOUSAND/UL (ref 0–0.1)
BASOPHILS NFR MAR MANUAL: 0 % (ref 0–1)
BUN SERPL-MCNC: 9 MG/DL (ref 5–25)
CALCIUM SERPL-MCNC: 9.2 MG/DL (ref 8.3–10.1)
CHLORIDE SERPL-SCNC: 111 MMOL/L (ref 96–108)
CO2 SERPL-SCNC: 25 MMOL/L (ref 21–32)
CREAT SERPL-MCNC: 0.99 MG/DL (ref 0.6–1.3)
EOSINOPHIL # BLD MANUAL: 0 THOUSAND/UL (ref 0–0.4)
EOSINOPHIL NFR BLD MANUAL: 0 % (ref 0–6)
ERYTHROCYTE [DISTWIDTH] IN BLOOD BY AUTOMATED COUNT: 16.1 % (ref 11.6–15.1)
EXT PREG TEST URINE: NEGATIVE
EXT. CONTROL ED NAV: NORMAL
GFR SERPL CREATININE-BSD FRML MDRD: 82 ML/MIN/1.73SQ M
GLUCOSE SERPL-MCNC: 110 MG/DL (ref 65–140)
HCT VFR BLD AUTO: 37.6 % (ref 34.8–46.1)
HGB BLD-MCNC: 11.5 G/DL (ref 11.5–15.4)
LYMPHOCYTES # BLD AUTO: 1.44 THOUSAND/UL (ref 0.6–4.47)
LYMPHOCYTES # BLD AUTO: 25 % (ref 14–44)
MCH RBC QN AUTO: 24.3 PG (ref 26.8–34.3)
MCHC RBC AUTO-ENTMCNC: 30.6 G/DL (ref 31.4–37.4)
MCV RBC AUTO: 80 FL (ref 82–98)
MONOCYTES # BLD AUTO: 0.35 THOUSAND/UL (ref 0–1.22)
MONOCYTES NFR BLD: 6 % (ref 4–12)
NEUTROPHILS # BLD MANUAL: 3.98 THOUSAND/UL (ref 1.85–7.62)
NEUTS SEG NFR BLD AUTO: 69 % (ref 43–75)
PLATELET # BLD AUTO: 303 THOUSANDS/UL (ref 149–390)
PLATELET BLD QL SMEAR: ADEQUATE
PLATELET CLUMP BLD QL SMEAR: PRESENT
PMV BLD AUTO: 11.3 FL (ref 8.9–12.7)
POTASSIUM SERPL-SCNC: 3.7 MMOL/L (ref 3.5–5.3)
RBC # BLD AUTO: 4.73 MILLION/UL (ref 3.81–5.12)
SODIUM SERPL-SCNC: 137 MMOL/L (ref 135–147)
WBC # BLD AUTO: 5.77 THOUSAND/UL (ref 4.31–10.16)

## 2022-07-18 PROCEDURE — 85027 COMPLETE CBC AUTOMATED: CPT

## 2022-07-18 PROCEDURE — 85007 BL SMEAR W/DIFF WBC COUNT: CPT

## 2022-07-18 PROCEDURE — 81025 URINE PREGNANCY TEST: CPT

## 2022-07-18 PROCEDURE — 36415 COLL VENOUS BLD VENIPUNCTURE: CPT

## 2022-07-18 PROCEDURE — 96374 THER/PROPH/DIAG INJ IV PUSH: CPT

## 2022-07-18 PROCEDURE — 99284 EMERGENCY DEPT VISIT MOD MDM: CPT

## 2022-07-18 PROCEDURE — 80048 BASIC METABOLIC PNL TOTAL CA: CPT

## 2022-07-18 PROCEDURE — 99284 EMERGENCY DEPT VISIT MOD MDM: CPT | Performed by: EMERGENCY MEDICINE

## 2022-07-18 PROCEDURE — 96375 TX/PRO/DX INJ NEW DRUG ADDON: CPT

## 2022-07-18 PROCEDURE — 96361 HYDRATE IV INFUSION ADD-ON: CPT

## 2022-07-18 RX ORDER — ONDANSETRON 2 MG/ML
4 INJECTION INTRAMUSCULAR; INTRAVENOUS ONCE
Status: COMPLETED | OUTPATIENT
Start: 2022-07-18 | End: 2022-07-18

## 2022-07-18 RX ORDER — ONDANSETRON 2 MG/ML
4 INJECTION INTRAMUSCULAR; INTRAVENOUS ONCE
Status: DISCONTINUED | OUTPATIENT
Start: 2022-07-18 | End: 2022-07-18

## 2022-07-18 RX ORDER — ACETAMINOPHEN 325 MG/1
975 TABLET ORAL ONCE
Status: COMPLETED | OUTPATIENT
Start: 2022-07-18 | End: 2022-07-18

## 2022-07-18 RX ORDER — ONDANSETRON 4 MG/1
4 TABLET, ORALLY DISINTEGRATING ORAL EVERY 8 HOURS PRN
Qty: 4 TABLET | Refills: 0 | Status: SHIPPED | OUTPATIENT
Start: 2022-07-18

## 2022-07-18 RX ORDER — NAPROXEN 500 MG/1
500 TABLET ORAL 2 TIMES DAILY WITH MEALS
Qty: 30 TABLET | Refills: 0 | Status: SHIPPED | OUTPATIENT
Start: 2022-07-18

## 2022-07-18 RX ORDER — KETOROLAC TROMETHAMINE 30 MG/ML
15 INJECTION, SOLUTION INTRAMUSCULAR; INTRAVENOUS ONCE
Status: COMPLETED | OUTPATIENT
Start: 2022-07-18 | End: 2022-07-18

## 2022-07-18 RX ADMIN — ACETAMINOPHEN 975 MG: 325 TABLET ORAL at 22:19

## 2022-07-18 RX ADMIN — ONDANSETRON 4 MG: 2 INJECTION INTRAMUSCULAR; INTRAVENOUS at 20:18

## 2022-07-18 RX ADMIN — SODIUM CHLORIDE 1000 ML: 0.9 INJECTION, SOLUTION INTRAVENOUS at 20:18

## 2022-07-18 RX ADMIN — KETOROLAC TROMETHAMINE 15 MG: 30 INJECTION, SOLUTION INTRAMUSCULAR at 21:06

## 2022-07-18 NOTE — ED ATTENDING ATTESTATION
7/18/2022  IDuncan MD, saw and evaluated the patient  I have discussed the patient with the resident/non-physician practitioner and agree with the resident's/non-physician practitioner's findings, Plan of Care, and MDM as documented in the resident's/non-physician practitioner's note, except where noted  All available labs and Radiology studies were reviewed  I was present for key portions of any procedure(s) performed by the resident/non-physician practitioner and I was immediately available to provide assistance  At this point I agree with the current assessment done in the Emergency Department    I have conducted an independent evaluation of this patient a history and physical is as follows:  starte menses today and has lower abd cramps which is common for her menses  No fever no chills no dysuria    Had been on bcp   Stopped on her own   Exam  nad  Anicteric   Lungs clear heart rrr no m abd soft  Pos bs  nd  Mild tender suprapubic area lower no rebound no guarding  Impression dysmenorrhea  Symptomatic care  ED Course         Critical Care Time  Procedures

## 2022-07-19 NOTE — ED PROVIDER NOTES
History  Chief Complaint   Patient presents with    Abdominal Cramping     Reports she is having menstrual cramps, stopped her birth control which is making symptoms worse  Reports vomiting  Patient is a 16-year-old female no past medical history presents to the ED for evaluation of acute onset nonradiating lower abdominal pain described as cramping x1 day with associated vaginal bleeding  Patient was seen here 2 months ago for menstrual cramping  She states that tonight's episode is consistent with her prior episodes of menstrual cramping and that she just started her period today  She also complains of nausea and vomiting but denies fever chills chest pain shortness of breath headache dysuria hematuria or any other complaints  Of note patient was placed on birth control pills which she states helped her menstrual cramp pain however she stopped taking the pills because she was gaining weight  Patient tried to take ibuprofen for pain relief but was unable to tolerate p o  Prior to Admission Medications   Prescriptions Last Dose Informant Patient Reported? Taking? Loratadine (Claritin) 10 MG CAPS   Yes No   Sig: Take by mouth   benzoyl peroxide 5 % gel   Yes No   ibuprofen (MOTRIN) 600 mg tablet   No No   Sig: Take 1 tablet (600 mg total) by mouth every 6 (six) hours as needed for mild pain   levonorgestrel-ethinyl estradiol (Iclevia) 0 15-0 03 MG per tablet   No No   Sig: Take 1 tablet by mouth daily   ondansetron (ZOFRAN) 4 mg tablet   No No   Sig: Take 1 tablet (4 mg total) by mouth every 6 (six) hours   ondansetron (Zofran ODT) 4 mg disintegrating tablet   No No   Sig: Take 1 tablet (4 mg total) by mouth every 6 (six) hours as needed for nausea or vomiting      Facility-Administered Medications: None       Past Medical History:   Diagnosis Date    Anemia     Seasonal allergic reaction        History reviewed  No pertinent surgical history  History reviewed   No pertinent family history  I have reviewed and agree with the history as documented  E-Cigarette/Vaping    E-Cigarette Use Never User      E-Cigarette/Vaping Substances    Nicotine No     THC No     CBD No     Flavoring No     Other No     Unknown No      Social History     Tobacco Use    Smoking status: Never Smoker    Smokeless tobacco: Never Used   Vaping Use    Vaping Use: Never used   Substance Use Topics    Alcohol use: Never    Drug use: Never        Review of Systems   Constitutional: Positive for appetite change  Negative for chills and fever  HENT: Negative for rhinorrhea and sore throat  Eyes: Negative for pain  Respiratory: Negative for shortness of breath  Cardiovascular: Negative for chest pain  Gastrointestinal: Positive for abdominal pain, nausea and vomiting  Negative for diarrhea  Genitourinary: Positive for menstrual problem and vaginal bleeding  Negative for dysuria  Musculoskeletal: Negative for back pain  Skin: Negative for rash  Neurological: Negative for headaches  All other systems reviewed and are negative  Physical Exam  ED Triage Vitals   Temperature Pulse Respirations Blood Pressure SpO2   07/18/22 2134 07/18/22 1919 07/18/22 1919 07/18/22 1919 07/18/22 1919   97 9 °F (36 6 °C) 55 20 149/93 97 %      Temp Source Heart Rate Source Patient Position - Orthostatic VS BP Location FiO2 (%)   07/18/22 2134 07/18/22 2145 -- -- --   Oral Monitor         Pain Score       07/18/22 1919       10 - Worst Possible Pain             Orthostatic Vital Signs  Vitals:    07/18/22 1919 07/18/22 2145   BP: 149/93 159/97   Pulse: 55 59       Physical Exam  Vitals and nursing note reviewed  Constitutional:       Appearance: Normal appearance  She is obese  HENT:      Head: Normocephalic and atraumatic  Mouth/Throat:      Mouth: Mucous membranes are moist    Eyes:      Extraocular Movements: Extraocular movements intact     Cardiovascular:      Rate and Rhythm: Normal rate and regular rhythm  Heart sounds: Normal heart sounds  No murmur heard  No friction rub  No gallop  Pulmonary:      Effort: No respiratory distress  Breath sounds: Normal breath sounds  No wheezing, rhonchi or rales  Abdominal:      Palpations: Abdomen is soft  Tenderness: There is abdominal tenderness in the right lower quadrant, suprapubic area and left lower quadrant  There is no guarding or rebound  Comments: Patient is actively vomiting on exam scant amount of saliva tinged with gastric fluid  Nonbilious nonbloody  Musculoskeletal:         General: Normal range of motion  Cervical back: Normal range of motion  Skin:     General: Skin is warm and dry  Neurological:      General: No focal deficit present  Mental Status: She is alert and oriented to person, place, and time  ED Medications  Medications   ondansetron (ZOFRAN) injection 4 mg (4 mg Intravenous Given 7/18/22 2018)   ketorolac (TORADOL) injection 15 mg (15 mg Intravenous Given 7/18/22 2106)   sodium chloride 0 9 % bolus 1,000 mL (0 mL Intravenous Stopped 7/18/22 2221)   acetaminophen (TYLENOL) tablet 975 mg (975 mg Oral Given 7/18/22 2219)       Diagnostic Studies  Results Reviewed     Procedure Component Value Units Date/Time    CBC and differential [[id number]]  (Abnormal) Collected: 07/18/22 2021    Lab Status: Final result Specimen: Blood from Arm, Left Updated: 07/18/22 2134     WBC 5 77 Thousand/uL      RBC 4 73 Million/uL      Hemoglobin 11 5 g/dL      Hematocrit 37 6 %      MCV 80 fL      MCH 24 3 pg      MCHC 30 6 g/dL      RDW 16 1 %      MPV 11 3 fL      Platelets 376 Thousands/uL     Narrative: This is an appended report  These results have been appended to a previously verified report      Manual Differential(PHLEBS Do Not Order) [[id number]] Collected: 07/18/22 2021    Lab Status: Final result Specimen: Blood from Arm, Left Updated: 07/18/22 2134     Segmented % 69 %      Lymphocytes % 25 %      Monocytes % 6 %      Eosinophils, % 0 %      Basophils % 0 %      Absolute Neutrophils 3 98 Thousand/uL      Lymphocytes Absolute 1 44 Thousand/uL      Monocytes Absolute 0 35 Thousand/uL      Eosinophils Absolute 0 00 Thousand/uL      Basophils Absolute 0 00 Thousand/uL      Total Counted --     Anisocytosis Present     Platelet Estimate Adequate     Clumped Platelets Present    POCT pregnancy, urine [[id number]]  (Normal) Resulted: 07/18/22 2105    Lab Status: Final result Updated: 07/18/22 2107     EXT PREG TEST UR (Ref: Negative) negative     Control valid    Basic metabolic panel [[id number]]  (Abnormal) Collected: 07/18/22 2021    Lab Status: Final result Specimen: Blood from Arm, Left Updated: 07/18/22 2047     Sodium 137 mmol/L      Potassium 3 7 mmol/L      Chloride 111 mmol/L      CO2 25 mmol/L      ANION GAP 1 mmol/L      BUN 9 mg/dL      Creatinine 0 99 mg/dL      Glucose 110 mg/dL      Calcium 9 2 mg/dL      eGFR 82 ml/min/1 73sq m     Narrative:      [place] guidelines for Chronic Kidney Disease (CKD):     Stage 1 with normal or high GFR (GFR > 90 mL/min/1 73 square meters)    Stage 2 Mild CKD (GFR = 60-89 mL/min/1 73 square meters)    Stage 3A Moderate CKD (GFR = 45-59 mL/min/1 73 square meters)    Stage 3B Moderate CKD (GFR = 30-44 mL/min/1 73 square meters)    Stage 4 Severe CKD (GFR = 15-29 mL/min/1 73 square meters)    Stage 5 End Stage CKD (GFR <15 mL/min/1 73 square meters)  Note: GFR calculation is accurate only with a steady state creatinine                 No orders to display         Procedures  Procedures      ED Course  ED Course as of 07/18/22 2248 Mon Jul 18, 2022 2025 Chloride(!): 111  Most likely elevated in the setting of vomiting   2105 PREGNANCY TEST URINE: negative  Can r/o ectopic     2137 On reassessment patient is feeling better her nausea has resolved her pain is better  Abdomen is soft nontender  Will p o  Challenge    If she is able to tolerate p o  Once she finishes her fluids she will be able to be discharged   2203 Patient unable to tolerate p o  So she spit up the water was able eat some crackers states "I have not thrown this up yet"  Will re-dose Zofran and allow fluids to finish  Will reassess   2213 Patient declining Zofran requesting more pain medication  Will give dose of Tylenol in the ED and discharged home with outpatient Ob gyn follow-up                                       MDM  Number of Diagnoses or Management Options  Abdominal cramping  Menstrual cramps  Nausea and vomiting in adult  Diagnosis management comments: Patient is a 79-year-old female presenting ED for evaluation of lower abdominal cramping and nausea vomiting  On initial presentation patient was actively vomiting  Vomitus was nonbilious nonbloody, was scant saliva and gastric contents  She was afebrile and hemodynamically stable  Her abdomen was soft and had some mild tenderness to palpation to the right lower quadrant, suprapubic region, and left lower quadrant  Pain did not radiate or localize anywhere  Patient is currently on her period  Most likely recurrent episode of severe menstrual cramps given the quality is described as exactly the same as prior episodes, however appendicitis and ectopic pregnancy were also considered in the differential   Appendicitis unlikely in the setting of afebrile, non localizing pain, normal white count  Ectopic pregnancy ruled out with negative urine pregnancy test   No need for further imaging at this time  Patient was resuscitated with 1 L of normal saline  Zofran was given for antiemetic and Toradol for analgesia  Patient's belly exam remained benign  She was p o  Challenged and initially failed, tried again and was able tolerate crackers  More Zofran was offered but she declined, requesting more pain medication  Gave her Tylenol      Patient remained hemodynamically stable during her entire ED course  Her oxygen saturation was normal   Patient now tolerating p o  Will discharge her home with outpatient follow-up with her primary care physician  Will send script for naproxen 500 mg and 4 tablets of Zofran    Advised patient to follow-up with her OBGYN so that they may follow up with her and prescribe her some form of birth control, as this provided a menstrual leave to symptoms in the past, however she stopped due to undesirable side effects  Impress to her that I felt that this would benefit her and that she should follow-up  Return precautions given patient verbalized understanding  Patient is hemodynamically stable and cleared for discharge with outpatient follow-up  Patient Progress  Patient progress: stable      Disposition  Final diagnoses:   Abdominal cramping   Menstrual cramps   Nausea and vomiting in adult     Time reflects when diagnosis was documented in both MDM as applicable and the Disposition within this note     Time User Action Codes Description Comment    7/18/2022  9:42 PM Karthik Kristal Add [R10 9] Abdominal cramping     7/18/2022  9:42 PM Karthik Kristal Add [N94 6] Menstrual cramps     7/18/2022  9:42 PM Karthik Kristal Modify [R10 9] Abdominal cramping     7/18/2022  9:42 PM Karthik Kristal Modify [N94 6] Menstrual cramps     7/18/2022  9:43 PM Karthik Kristal Add [R11 2] Nausea and vomiting in adult       ED Disposition     ED Disposition   Discharge    Condition   Stable    Date/Time   Mon Jul 18, 2022  9:42 PM    Comment   Robinsonjulianna Gamez discharge to home/self care                 Follow-up Information     Follow up With Specialties Details Why Contact Linwood Boss DO Family Medicine Schedule an appointment as soon as possible for a visit in 1 week As needed, If symptoms worsen [address] [facility] [id number] [phone]            Discharge Medication List as of 7/18/2022 10:15 PM      START taking these medications    Details   naproxen (Naprosyn) 500 mg tablet Take 1 tablet (500 mg total) by mouth 2 (two) times a day with meals, Starting Mon 7/18/2022, Print      !! ondansetron (Zofran ODT) 4 mg disintegrating tablet Take 1 tablet (4 mg total) by mouth every 8 (eight) hours as needed for nausea or vomiting, Starting Mon 7/18/2022, Print       !! - Potential duplicate medications found  Please discuss with provider  CONTINUE these medications which have NOT CHANGED    Details   benzoyl peroxide 5 % gel Starting Fri 10/22/2021, Historical Med      ibuprofen (MOTRIN) 600 mg tablet Take 1 tablet (600 mg total) by mouth every 6 (six) hours as needed for mild pain, Starting Mon 5/16/2022, Normal      levonorgestrel-ethinyl estradiol (Iclevia) 0 15-0 03 MG per tablet Take 1 tablet by mouth daily, Starting Tue 4/5/2022, Normal      Loratadine (Claritin) 10 MG CAPS Take by mouth, Historical Med      !! ondansetron (Zofran ODT) 4 mg disintegrating tablet Take 1 tablet (4 mg total) by mouth every 6 (six) hours as needed for nausea or vomiting, Starting Mon 5/16/2022, Normal      ondansetron (ZOFRAN) 4 mg tablet Take 1 tablet (4 mg total) by mouth every 6 (six) hours, Starting Fri 2/11/2022, Normal       !! - Potential duplicate medications found  Please discuss with provider  No discharge procedures on file  PDMP Review     None           ED Provider  Attending physically available and evaluated Rimma Sevilla  DOROTEO managed the patient along with the ED Attending      Electronically Signed by         Luis Enrique Beauchamp DO  07/18/22 0436

## 2022-07-19 NOTE — DISCHARGE INSTRUCTIONS
Please follow-up with your OBGYN for outpatient management and evaluation  If you develop fever continued to vomit are unable to tolerate food or water by mouth, if your pain worsens or persists or you have any other symptoms please return to the ED for further evaluation and treatment

## 2022-11-23 ENCOUNTER — HOSPITAL ENCOUNTER (EMERGENCY)
Facility: HOSPITAL | Age: 22
Discharge: HOME | End: 2022-11-23
Attending: EMERGENCY MEDICINE

## 2022-11-23 VITALS
SYSTOLIC BLOOD PRESSURE: 128 MMHG | HEART RATE: 96 BPM | OXYGEN SATURATION: 98 % | RESPIRATION RATE: 18 BRPM | DIASTOLIC BLOOD PRESSURE: 59 MMHG | TEMPERATURE: 98.5 F

## 2022-11-23 DIAGNOSIS — B34.9 VIRAL SYNDROME: Primary | ICD-10-CM

## 2022-11-23 LAB
EXT PREGNANCY TEST URINE: NEGATIVE
EXT. CONTROL: NORMAL
FLUAV RNA RESP QL NAA+PROBE: POSITIVE
FLUBV RNA RESP QL NAA+PROBE: NEGATIVE
RSV RNA RESP QL NAA+PROBE: NEGATIVE
SARS-COV-2 RNA RESP QL NAA+PROBE: NEGATIVE

## 2022-11-23 RX ORDER — PSEUDOEPHEDRINE HCL 120 MG/1
120 TABLET, FILM COATED, EXTENDED RELEASE ORAL 2 TIMES DAILY
Qty: 30 TABLET | Refills: 0 | Status: SHIPPED | OUTPATIENT
Start: 2022-11-23 | End: 2022-12-03

## 2022-11-23 RX ORDER — ONDANSETRON 4 MG/1
4 TABLET, ORALLY DISINTEGRATING ORAL ONCE
Status: COMPLETED | OUTPATIENT
Start: 2022-11-23 | End: 2022-11-23

## 2022-11-23 RX ORDER — ONDANSETRON 4 MG/1
4 TABLET, ORALLY DISINTEGRATING ORAL EVERY 6 HOURS PRN
Qty: 20 TABLET | Refills: 0 | Status: SHIPPED | OUTPATIENT
Start: 2022-11-23

## 2022-11-23 RX ORDER — KETOROLAC TROMETHAMINE 30 MG/ML
15 INJECTION, SOLUTION INTRAMUSCULAR; INTRAVENOUS ONCE
Status: COMPLETED | OUTPATIENT
Start: 2022-11-23 | End: 2022-11-23

## 2022-11-23 RX ORDER — ACETAMINOPHEN 325 MG/1
975 TABLET ORAL ONCE
Status: COMPLETED | OUTPATIENT
Start: 2022-11-23 | End: 2022-11-23

## 2022-11-23 RX ADMIN — KETOROLAC TROMETHAMINE 15 MG: 30 INJECTION, SOLUTION INTRAMUSCULAR; INTRAVENOUS at 04:41

## 2022-11-23 RX ADMIN — ACETAMINOPHEN 975 MG: 325 TABLET ORAL at 04:38

## 2022-11-23 RX ADMIN — ONDANSETRON 4 MG: 4 TABLET, ORALLY DISINTEGRATING ORAL at 04:38

## 2022-11-23 NOTE — ED PROVIDER NOTES
History  Chief Complaint   Patient presents with   • Generalized Body Aches     Pt c/o body aches, sore throat, chills, headache  The patient is a 53-year-old female with no significant past medical history presenting to the ED for evaluation of a 1 day history of body aches, sore throat, chills, headache, fever (earlier today, has since resolved), nausea, congestion, rhinorrhea, and productive cough  She last a ibuprofen for her symptoms at midnight  She otherwise denies neck pain, neck stiffness, vision changes, numbness, paresthesia, rash, abdominal pain, vomiting, diarrhea, constipation, dysuria, hematuria, melena, hematochezia  Prior to Admission Medications   Prescriptions Last Dose Informant Patient Reported? Taking?    Loratadine 10 MG CAPS   Yes Yes   Sig: Take by mouth   benzoyl peroxide 5 % gel Not Taking  Yes No   Patient not taking: Reported on 11/23/2022   ibuprofen (MOTRIN) 600 mg tablet   No Yes   Sig: Take 1 tablet (600 mg total) by mouth every 6 (six) hours as needed for mild pain   levonorgestrel-ethinyl estradiol (Iclevia) 0 15-0 03 MG per tablet Not Taking  No No   Sig: Take 1 tablet by mouth daily   Patient not taking: Reported on 11/23/2022   naproxen (Naprosyn) 500 mg tablet   No Yes   Sig: Take 1 tablet (500 mg total) by mouth 2 (two) times a day with meals   ondansetron (ZOFRAN) 4 mg tablet Not Taking  No No   Sig: Take 1 tablet (4 mg total) by mouth every 6 (six) hours   Patient not taking: Reported on 11/23/2022   ondansetron (Zofran ODT) 4 mg disintegrating tablet Not Taking  No No   Sig: Take 1 tablet (4 mg total) by mouth every 6 (six) hours as needed for nausea or vomiting   Patient not taking: Reported on 11/23/2022   ondansetron (Zofran ODT) 4 mg disintegrating tablet Not Taking  No No   Sig: Take 1 tablet (4 mg total) by mouth every 8 (eight) hours as needed for nausea or vomiting   Patient not taking: Reported on 11/23/2022      Facility-Administered Medications: None       Past Medical History:   Diagnosis Date   • Anemia    • Seasonal allergic reaction        History reviewed  No pertinent surgical history  History reviewed  No pertinent family history  I have reviewed and agree with the history as documented  E-Cigarette/Vaping   • E-Cigarette Use Never User      E-Cigarette/Vaping Substances   • Nicotine No    • THC No    • CBD No    • Flavoring No    • Other No    • Unknown No      Social History     Tobacco Use   • Smoking status: Never   • Smokeless tobacco: Never   Vaping Use   • Vaping Use: Never used   Substance Use Topics   • Alcohol use: Never   • Drug use: Never       Review of Systems   Constitutional: Positive for chills and fever  HENT: Positive for congestion, rhinorrhea and sore throat  Negative for trouble swallowing  Eyes: Negative for photophobia and visual disturbance  Respiratory: Positive for cough  Negative for shortness of breath  Cardiovascular: Negative for chest pain and leg swelling  Gastrointestinal: Positive for nausea  Negative for abdominal pain, constipation, diarrhea and vomiting  Genitourinary: Negative for dysuria, flank pain, vaginal bleeding, vaginal discharge and vaginal pain  Musculoskeletal: Positive for myalgias  Negative for arthralgias  Skin: Negative for rash and wound  Neurological: Positive for headaches  Negative for dizziness, weakness and numbness  Psychiatric/Behavioral: Negative for behavioral problems  Physical Exam  Physical Exam  Vitals and nursing note reviewed  Constitutional:       General: She is not in acute distress  Appearance: She is well-developed  She is not ill-appearing or toxic-appearing  HENT:      Head: Normocephalic and atraumatic  Right Ear: External ear normal       Left Ear: External ear normal       Nose: Congestion and rhinorrhea present        Mouth/Throat:      Mouth: Mucous membranes are moist       Pharynx: No oropharyngeal exudate or posterior oropharyngeal erythema  Eyes:      Extraocular Movements: Extraocular movements intact  Conjunctiva/sclera: Conjunctivae normal       Pupils: Pupils are equal, round, and reactive to light  Cardiovascular:      Rate and Rhythm: Normal rate and regular rhythm  Pulses: Normal pulses  Heart sounds: Normal heart sounds  No murmur heard  Pulmonary:      Effort: Pulmonary effort is normal  No respiratory distress  Breath sounds: Normal breath sounds  No stridor  No wheezing, rhonchi or rales  Abdominal:      Palpations: Abdomen is soft  Tenderness: There is no abdominal tenderness  There is no guarding or rebound  Musculoskeletal:         General: No swelling or tenderness (no calf pain)  Normal range of motion  Cervical back: Neck supple  No rigidity or tenderness  Right lower leg: No edema  Left lower leg: No edema  Skin:     General: Skin is warm and dry  Capillary Refill: Capillary refill takes less than 2 seconds  Neurological:      Mental Status: She is alert  Cranial Nerves: No cranial nerve deficit  Sensory: No sensory deficit  Motor: No weakness        Gait: Gait normal    Psychiatric:         Mood and Affect: Mood normal          Vital Signs  ED Triage Vitals   Temperature Pulse Respirations Blood Pressure SpO2   11/23/22 0357 11/23/22 0357 11/23/22 0357 11/23/22 0357 11/23/22 0357   98 5 °F (36 9 °C) 74 18 157/87 98 %      Temp Source Heart Rate Source Patient Position - Orthostatic VS BP Location FiO2 (%)   11/23/22 0357 11/23/22 0357 11/23/22 0500 11/23/22 0357 --   Oral Monitor Sitting Right arm       Pain Score       11/23/22 0438       4           Vitals:    11/23/22 0357 11/23/22 0500   BP: 157/87 128/59   Pulse: 74 96   Patient Position - Orthostatic VS:  Sitting         Visual Acuity      ED Medications  Medications   ketorolac (TORADOL) injection 15 mg (15 mg Intramuscular Given 11/23/22 0441)   acetaminophen (TYLENOL) tablet 628 mg (975 mg Oral Given 11/23/22 0438)   ondansetron (ZOFRAN-ODT) dispersible tablet 4 mg (4 mg Oral Given 11/23/22 0438)       Diagnostic Studies  Results Reviewed     Procedure Component Value Units Date/Time    FLU/RSV/COVID - if FLU/RSV clinically relevant [[id number]] Collected: 11/23/22 0440    Lab Status: In process Specimen: Nares from Nose Updated: 11/23/22 0457    POCT pregnancy, urine [[id number]]  (Normal) Resulted: 11/23/22 0444    Lab Status: Final result Updated: 11/23/22 0444     EXT Preg Test, Ur Negative     Control Valid                 No orders to display              Procedures  Procedures         ED Course                               MDM  Number of Diagnoses or Management Options  Viral syndrome: new and requires workup  Diagnosis management comments: On exam, the patient is well-appearing in no acute distress  No dyspnea, tachypnea, cyanosis, or any other respiratory distress  Lungs CTA bilaterally  HRRR  Patient is well hydrated with moist mucous membranes  Vital signs stable  No neck stiffness  Abdomen is soft and nontender  Sensation grossly intact, strength 5/5 in all extremities  No cranial nerve deficit  The patient has a history and physical exam consistent with a viral illness  COVID19, RSV, and Flu testing has been performed  Will obtain U preg, give Toradol, Tylenol, Zofran  At the time of discharge, the patient is in no acute distress  I discussed with the patient the diagnosis, treatment plan, follow-up, return precautions, and discharge instructions; they were given the opportunity to ask questions and verbalized understanding           Amount and/or Complexity of Data Reviewed  Clinical lab tests: ordered  Tests in the medicine section of CPT®: ordered and reviewed  Decide to obtain previous medical records or to obtain history from someone other than the patient: yes  Review and summarize past medical records: yes    Risk of Complications, Morbidity, and/or Mortality  Presenting problems: low  Management options: low    Patient Progress  Patient progress: improved      Disposition  Final diagnoses:   Viral syndrome     Time reflects when diagnosis was documented in both MDM as applicable and the Disposition within this note     Time User Action Codes Description Comment    11/23/2022  4:53 AM Jeffery Francis [B34 9] Viral syndrome       ED Disposition     ED Disposition   Discharge    Condition   Stable    Date/Time   Wed Nov 23, 2022  4:53 AM    Comment   Nedra Jaimest discharge to home/self care  Follow-up Information     Follow up With Specialties Details Why [address] [id number] [phone]            Discharge Medication List as of 11/23/2022  4:56 AM      START taking these medications    Details   !! ondansetron (Zofran ODT) 4 mg disintegrating tablet Take 1 tablet (4 mg total) by mouth every 6 (six) hours as needed for nausea or vomiting, Starting Wed 11/23/2022, Normal      pseudoephedrine (SUDAFED) 120 MG 12 hr tablet Take 1 tablet (120 mg total) by mouth 2 (two) times a day for 10 days, Starting Wed 11/23/2022, Until Sat 12/3/2022, Normal       !! - Potential duplicate medications found  Please discuss with provider        CONTINUE these medications which have NOT CHANGED    Details   ibuprofen (MOTRIN) 600 mg tablet Take 1 tablet (600 mg total) by mouth every 6 (six) hours as needed for mild pain, Starting Mon 5/16/2022, Normal      Loratadine 10 MG CAPS Take by mouth, Historical Med      naproxen (Naprosyn) 500 mg tablet Take 1 tablet (500 mg total) by mouth 2 (two) times a day with meals, Starting Mon 7/18/2022, Print      benzoyl peroxide 5 % gel Starting Fri 10/22/2021, Historical Med      levonorgestrel-ethinyl estradiol (Iclevia) 0 15-0 03 MG per tablet Take 1 tablet by mouth daily, Starting Tue 4/5/2022, Normal      !! ondansetron (Zofran ODT) 4 mg disintegrating tablet Take 1 tablet (4 mg total) by mouth every 6 (six) hours as needed for nausea or vomiting, Starting Mon 5/16/2022, Normal      !! ondansetron (Zofran ODT) 4 mg disintegrating tablet Take 1 tablet (4 mg total) by mouth every 8 (eight) hours as needed for nausea or vomiting, Starting Mon 7/18/2022, Print      ondansetron (ZOFRAN) 4 mg tablet Take 1 tablet (4 mg total) by mouth every 6 (six) hours, Starting Fri 2/11/2022, Normal       !! - Potential duplicate medications found  Please discuss with provider  No discharge procedures on file      PDMP Review     None          ED Provider  Electronically Signed by           Manjit Mclean PA-C  11/23/22 2229

## 2022-11-23 NOTE — Clinical Note
Christian Gaspar was seen and treated in our emergency department on 11/23/2022  Diagnosis:     Ronny June  is off the rest of the shift today  She may return on this date:     May not return to work until fever free x 24 hours     If you have any questions or concerns, please don't hesitate to call        Audelia Yeung PA-C    ______________________________           _______________          _______________  Hospital Representative                              Date                                Time

## 2022-11-23 NOTE — DISCHARGE INSTRUCTIONS
We will call if COVID/Flu/RSV positive    Return for chest pain, trouble breathing, neck pain/stiffness, severe headache, blood in vomit or stool, leg swelling, coughing up blood, numbness/weakness, vision changes, or any other new/concerning symptoms     Take Sudafed as prescribed for congestion     Take Zofran as prescribed, as needed for nausea    Take Ibuprofen as needed for pain; 600 mg every 6 hours as needed    You may take Tylenol : 500 to 1000 mg three times a day as needed   Do not exceed 3000 mg of tylenol a day as this can cause liver damage

## 2023-01-03 ENCOUNTER — HOSPITAL ENCOUNTER (EMERGENCY)
Facility: HOSPITAL | Age: 23
Discharge: HOME | End: 2023-01-03
Attending: EMERGENCY MEDICINE

## 2023-01-03 ENCOUNTER — APPOINTMENT (EMERGENCY)
Dept: RADIOLOGY | Facility: HOSPITAL | Age: 23
End: 2023-01-03

## 2023-01-03 VITALS
DIASTOLIC BLOOD PRESSURE: 133 MMHG | OXYGEN SATURATION: 99 % | SYSTOLIC BLOOD PRESSURE: 163 MMHG | RESPIRATION RATE: 18 BRPM | TEMPERATURE: 98 F | HEART RATE: 62 BPM

## 2023-01-03 DIAGNOSIS — N94.6 DYSMENORRHEA: ICD-10-CM

## 2023-01-03 DIAGNOSIS — R10.2 PELVIC PAIN: Primary | ICD-10-CM

## 2023-01-03 DIAGNOSIS — R11.2 NAUSEA AND VOMITING: ICD-10-CM

## 2023-01-03 LAB
BACTERIA UR QL AUTO: ABNORMAL /HPF
BILIRUB UR QL STRIP: NEGATIVE
CLARITY UR: CLEAR
COLOR UR: YELLOW
EXT PREGNANCY TEST URINE: NEGATIVE
EXT. CONTROL: NORMAL
GLUCOSE UR STRIP-MCNC: NEGATIVE MG/DL
HGB UR QL STRIP.AUTO: ABNORMAL
KETONES UR STRIP-MCNC: NEGATIVE MG/DL
LEUKOCYTE ESTERASE UR QL STRIP: NEGATIVE
MUCOUS THREADS UR QL AUTO: ABNORMAL
NITRITE UR QL STRIP: NEGATIVE
NON-SQ EPI CELLS URNS QL MICRO: ABNORMAL /HPF
PH UR STRIP.AUTO: 8.5 [PH] (ref 4.5–8)
PROT UR STRIP-MCNC: ABNORMAL MG/DL
RBC #/AREA URNS AUTO: ABNORMAL /HPF
SP GR UR STRIP.AUTO: 1.02 (ref 1–1.03)
UROBILINOGEN UR QL STRIP.AUTO: 0.2 E.U./DL
WBC #/AREA URNS AUTO: ABNORMAL /HPF

## 2023-01-03 RX ORDER — ONDANSETRON 4 MG/1
4 TABLET, FILM COATED ORAL EVERY 6 HOURS
Qty: 12 TABLET | Refills: 0 | Status: SHIPPED | OUTPATIENT
Start: 2023-01-03

## 2023-01-03 RX ORDER — KETOROLAC TROMETHAMINE 30 MG/ML
15 INJECTION, SOLUTION INTRAMUSCULAR; INTRAVENOUS ONCE
Status: COMPLETED | OUTPATIENT
Start: 2023-01-03 | End: 2023-01-03

## 2023-01-03 RX ORDER — ONDANSETRON 4 MG/1
4 TABLET, ORALLY DISINTEGRATING ORAL ONCE
Status: COMPLETED | OUTPATIENT
Start: 2023-01-03 | End: 2023-01-03

## 2023-01-03 RX ADMIN — ONDANSETRON 4 MG: 4 TABLET, ORALLY DISINTEGRATING ORAL at 13:56

## 2023-01-03 RX ADMIN — KETOROLAC TROMETHAMINE 15 MG: 30 INJECTION, SOLUTION INTRAMUSCULAR; INTRAVENOUS at 13:57

## 2023-01-03 NOTE — CONSULTS
Consultation - Obstetrics & Gynecology  Burton Matute 23 y o  female MRN: [id number]  Unit/Bed#: [facility] Encounter: [id number]      Assessment/Plan   Pelvic pain  Assessment & Plan  Patient reports abdominal pain and heavy vaginal bleeding  She uses 3-4 pads a day, which she says is consistent with her menses  She presented with LLQ pain, which has since resolved with pain medication  She continues to have mild RLQ and suprapubic tenderness  This pain and bleeding is likely menses  Ovarian torsion was on the differential, due to location of pain and US showing Doppler flow difficult to detect due to size and morphology of ovary  Right ovary: 2 6 x 1 5 x 2 5 cm  5 2 mL  Left ovary: 2 5 x 1 5 x 2 1 cm  4 2 mL  She reports she is not on birth control, pregnancy test is negative  Plan:  -Recommend outpatient follow-up with Ob/Gyn to discuss possible contraception for controlling dysmenorrhea/menorrhagia  -Return precautions given  If pain worsens, recommend patient report to [facility] ED due to GYN team in house      Counseling / Coordination of Care  Total floor / unit time spent today15 minutes  minutes  Greater than 50% of total time was spent with the patient and / or family counseling and / or coordination of care  Consult to obstetrics / gynecology  Consult performed by: Meagan Paniagua MD  Consult ordered by: Amanda Butcher MD        History of Present Illness   Physician Requesting Consult: Amanda Butcher MD  Reason for Consult / Principal Problem: LLQ pain    HPI: Burton Matute is a 23 y o  who presents with pelvic pain  She reports mild LLQ pain that since resolved with pain medication  She continues to have RLQ and suprapubic tenderness  She reports that she is going through 3-4 pads daily  She got her period 1 week earlier than she was supposed to  This pain and bleeding is consistent with her typical menstrual period  She is not currently on any birth control       Review of Systems Constitutional: Negative for activity change, fatigue and fever  Respiratory: Negative for shortness of breath  Gastrointestinal: Negative for constipation, diarrhea, nausea and vomiting  Genitourinary: Positive for pelvic pain and vaginal bleeding  Negative for vaginal discharge  Musculoskeletal: Negative for back pain  Neurological: Negative for light-headedness and headaches  Historical Information   Past Medical History:   Diagnosis Date   • Anemia    • Seasonal allergic reaction      History reviewed  No pertinent surgical history  OB History    Para Term  AB Living   0 0 0 0 0 0   SAB IAB Ectopic Multiple Live Births   0 0 0 0 0     History reviewed  No pertinent family history  Social History   Social History     Substance and Sexual Activity   Alcohol Use Never     Social History     Substance and Sexual Activity   Drug Use Never     Social History     Tobacco Use   Smoking Status Never   Smokeless Tobacco Never       Meds/Allergies   No current facility-administered medications for this encounter  Allergies   Allergen Reactions   • Pollen Extract Cough       Objective   Vitals: Blood pressure (!) 163/133, pulse 62, temperature 98 °F (36 7 °C), temperature source Oral, resp  rate 18, last menstrual period 2023, SpO2 99 %  There is no height or weight on file to calculate BMI  No intake or output data in the 24 hours ending 23 1656    Invasive Devices     None                 Physical Exam  Vitals reviewed  Constitutional:       Appearance: Normal appearance  HENT:      Head: Normocephalic and atraumatic  Mouth/Throat:      Mouth: Mucous membranes are moist       Pharynx: Oropharynx is clear  Cardiovascular:      Rate and Rhythm: Normal rate and regular rhythm  Pulses: Normal pulses  Heart sounds: Normal heart sounds  Pulmonary:      Effort: Pulmonary effort is normal  No respiratory distress        Breath sounds: Normal breath sounds  Chest:      Chest wall: No tenderness  Abdominal:      General: There is no distension  Palpations: Abdomen is soft  Tenderness: There is abdominal tenderness (mild RLQ and suprapubic tenderness)  There is no guarding or rebound  Skin:     General: Skin is warm and dry  Capillary Refill: Capillary refill takes less than 2 seconds  Neurological:      General: No focal deficit present  Mental Status: She is alert and oriented to person, place, and time  Mental status is at baseline  Lab Results:   Recent Results (from the past 24 hour(s))   Urine Macroscopic, POC    Collection Time: 01/03/23  2:22 PM   Result Value Ref Range    Color, UA Yellow     Clarity, UA Clear     pH, UA 8 5 (H) 4 5 - 8 0    Leukocytes, UA Negative Negative    Nitrite, UA Negative Negative    Protein,  (2+) (A) Negative mg/dl    Glucose, UA Negative Negative mg/dl    Ketones, UA Negative Negative mg/dl    Urobilinogen, UA 0 2 0 2, 1 0 E U /dl E U /dl    Bilirubin, UA Negative Negative    Occult Blood, UA Large (A) Negative    Specific Gravity, UA 1 020 1 003 - 1 030   Urine Microscopic    Collection Time: 01/03/23  2:22 PM   Result Value Ref Range    RBC, UA Innumerable (A) None Seen, 1-2 /hpf    WBC, UA 10-20 (A) None Seen, 1-2 /hpf    Epithelial Cells Occasional None Seen, Occasional /hpf    Bacteria, UA Occasional None Seen, Occasional /hpf    MUCUS THREADS Occasional (A) None Seen   POCT pregnancy, urine    Collection Time: 01/03/23  2:25 PM   Result Value Ref Range    EXT Preg Test, Ur Negative     Control Valid        Imaging:  Pelvic U/S:  1  Normal right ovary  Doppler flow within the left ovary was difficult to detect due to position though the ovary was normal in size and morphology      2  Heterogeneous myometrium with refractory shadowing    While there are no myometrial cysts visualized, this can be seen in the setting of adenomyosis        EKG, Pathology, and Other Studies: I have personally reviewed pertinent reports        Patient examined with Dr Cat Chacon MD  Obstetrics & Gynecology, PGY1

## 2023-01-03 NOTE — ASSESSMENT & PLAN NOTE
Patient reports abdominal pain and heavy vaginal bleeding  She uses 3-4 pads a day, which she says is consistent with her menses  She presented with LLQ pain, which has since resolved with pain medication  She continues to have mild RLQ and suprapubic tenderness  This pain and bleeding is likely menses  Ovarian torsion was on the differential, due to location of pain and US showing Doppler flow difficult to detect due to size and morphology of ovary  Right ovary: 2 6 x 1 5 x 2 5 cm  5 2 mL  Left ovary: 2 5 x 1 5 x 2 1 cm  4 2 mL  She reports she is not on birth control, pregnancy test is negative  Plan:  -Recommend outpatient follow-up with Ob/Gyn to discuss possible contraception for controlling dysmenorrhea/menorrhagia  -Return precautions given   If pain worsens, recommend patient report to [facility] ED due to GYN team in house

## 2023-01-03 NOTE — DISCHARGE INSTRUCTIONS
You were seen in the ED for painful mensturation  Return to the ED for any worsening symptoms or new symptoms  Follow up with your primary care doctor and OBGYN doctor as soon as possible

## 2023-01-03 NOTE — ED PROVIDER NOTES
History  Chief Complaint   Patient presents with   • Abdominal Pain     Pt reports pain from menstruation, +nausea; pt states she is two weeks early and she is bleeding more than usual; pt reports she has been unable to keep pain medication down, last attempted to take midol at 20     26-year-old female patient with history of anemia, painful menstruations presenting with pelvic cramping and vaginal bleeding onset last night worsening this morning  Patient states her period is a week early  Patient states that she is bleeding more and use 3-4 pads already today and normally goes through 3-4 pads a day  She also states her pain is worse  Patient also states nausea vomiting today and cannot keep any of her medications down  Dates multiple episodes of vomiting  Denies any fever, chest pain, urinary symptoms but admits to mild shortness of breath  Patient tried taking Excedrin but states that she has vomiting that medications up  States she had similar symptoms in the past before and found nothing on her CT to cause her these symptoms  Prior to Admission Medications   Prescriptions Last Dose Informant Patient Reported? Taking?    Loratadine 10 MG CAPS   Yes No   Sig: Take by mouth   benzoyl peroxide 5 % gel   Yes No   Patient not taking: Reported on 11/23/2022   ibuprofen (MOTRIN) 600 mg tablet   No No   Sig: Take 1 tablet (600 mg total) by mouth every 6 (six) hours as needed for mild pain   levonorgestrel-ethinyl estradiol (Iclevia) 0 15-0 03 MG per tablet   No No   Sig: Take 1 tablet by mouth daily   Patient not taking: Reported on 11/23/2022   naproxen (Naprosyn) 500 mg tablet   No No   Sig: Take 1 tablet (500 mg total) by mouth 2 (two) times a day with meals   ondansetron (ZOFRAN) 4 mg tablet   No No   Sig: Take 1 tablet (4 mg total) by mouth every 6 (six) hours   Patient not taking: Reported on 11/23/2022   ondansetron (Zofran ODT) 4 mg disintegrating tablet   No No   Sig: Take 1 tablet (4 mg total) by mouth every 6 (six) hours as needed for nausea or vomiting   Patient not taking: Reported on 11/23/2022   ondansetron (Zofran ODT) 4 mg disintegrating tablet   No No   Sig: Take 1 tablet (4 mg total) by mouth every 8 (eight) hours as needed for nausea or vomiting   Patient not taking: Reported on 11/23/2022   ondansetron (Zofran ODT) 4 mg disintegrating tablet   No No   Sig: Take 1 tablet (4 mg total) by mouth every 6 (six) hours as needed for nausea or vomiting   pseudoephedrine (SUDAFED) 120 MG 12 hr tablet   No No   Sig: Take 1 tablet (120 mg total) by mouth 2 (two) times a day for 10 days      Facility-Administered Medications: None       Past Medical History:   Diagnosis Date   • Anemia    • Seasonal allergic reaction        History reviewed  No pertinent surgical history  History reviewed  No pertinent family history  I have reviewed and agree with the history as documented  E-Cigarette/Vaping   • E-Cigarette Use Never User      E-Cigarette/Vaping Substances   • Nicotine No    • THC No    • CBD No    • Flavoring No    • Other No    • Unknown No      Social History     Tobacco Use   • Smoking status: Never   • Smokeless tobacco: Never   Vaping Use   • Vaping Use: Never used   Substance Use Topics   • Alcohol use: Never   • Drug use: Never        Review of Systems   Constitutional: Negative for chills, fatigue and fever  HENT: Negative for congestion, rhinorrhea and sore throat  Respiratory: Positive for shortness of breath  Negative for cough and chest tightness  Cardiovascular: Negative for chest pain and leg swelling  Gastrointestinal: Positive for abdominal pain, nausea and vomiting  Negative for abdominal distention and diarrhea  Genitourinary: Positive for menstrual problem and vaginal bleeding  Negative for difficulty urinating and dysuria  Musculoskeletal: Negative for arthralgias, back pain and myalgias  Skin: Negative for rash and wound     Neurological: Negative for dizziness, weakness and headaches  All other systems reviewed and are negative  Physical Exam  ED Triage Vitals   Temperature Pulse Respirations Blood Pressure SpO2   01/03/23 1318 01/03/23 1316 01/03/23 1316 01/03/23 1316 01/03/23 1316   98 °F (36 7 °C) 62 18 (!) 163/133 99 %      Temp Source Heart Rate Source Patient Position - Orthostatic VS BP Location FiO2 (%)   01/03/23 1318 01/03/23 1316 01/03/23 1316 01/03/23 1316 --   Oral Monitor Sitting Left arm       Pain Score       01/03/23 1357       9             Orthostatic Vital Signs  Vitals:    01/03/23 1316   BP: (!) 163/133   Pulse: 62   Patient Position - Orthostatic VS: Sitting       Physical Exam  Vitals reviewed  Constitutional:       Appearance: Normal appearance  HENT:      Head: Normocephalic and atraumatic  Nose: Nose normal       Mouth/Throat:      Mouth: Mucous membranes are moist       Pharynx: Oropharynx is clear  Eyes:      Extraocular Movements: Extraocular movements intact  Conjunctiva/sclera: Conjunctivae normal    Cardiovascular:      Rate and Rhythm: Normal rate and regular rhythm  Pulses: Normal pulses  Heart sounds: Normal heart sounds  Pulmonary:      Effort: Pulmonary effort is normal       Breath sounds: Normal breath sounds  Abdominal:      General: Bowel sounds are normal       Palpations: Abdomen is soft  Tenderness: There is abdominal tenderness in the epigastric area and suprapubic area  Musculoskeletal:         General: Normal range of motion  Cervical back: Normal range of motion  Skin:     General: Skin is warm and dry  Neurological:      General: No focal deficit present  Mental Status: She is alert and oriented to person, place, and time  Mental status is at baseline           ED Medications  Medications   ketorolac (TORADOL) injection 15 mg (15 mg Intramuscular Given 1/3/23 1457)   ondansetron (ZOFRAN-ODT) dispersible tablet 4 mg (4 mg Oral Given 1/3/23 1356) Diagnostic Studies  Results Reviewed     Procedure Component Value Units Date/Time    Urine Microscopic [[id number]]  (Abnormal) Collected: 01/03/23 1422    Lab Status: Final result Specimen: Urine, Clean Catch Updated: 01/03/23 1448     RBC, UA Innumerable /hpf      WBC, UA 10-20 /hpf      Epithelial Cells Occasional /hpf      Bacteria, UA Occasional /hpf      MUCUS THREADS Occasional    Urine culture [[id number]] Collected: 01/03/23 1422    Lab Status: In process Specimen: Urine, Clean Catch Updated: 01/03/23 1448    POCT pregnancy, urine [[id number]]  (Normal) Resulted: 01/03/23 1425    Lab Status: Final result Updated: 01/03/23 1425     EXT Preg Test, Ur Negative     Control Valid    Urine Macroscopic, POC [[id number]]  (Abnormal) Collected: 01/03/23 1422    Lab Status: Final result Specimen: Urine Updated: 01/03/23 1424     Color, UA Yellow     Clarity, UA Clear     pH, UA 8 5     Leukocytes, UA Negative     Nitrite, UA Negative     Protein,  (2+) mg/dl      Glucose, UA Negative mg/dl      Ketones, UA Negative mg/dl      Urobilinogen, UA 0 2 E U /dl      Bilirubin, UA Negative     Occult Blood, UA Large     Specific [place], UA 1 020    Narrative:      CLINITEK RESULT                 US pelvis complete w transvaginal   Final Result by Loy Mclain MD (01/03 1552)       1  Normal right ovary  Doppler flow within the left ovary was difficult to detect due to position though the ovary was normal in size and morphology  2   Heterogeneous myometrium with refractory shadowing  While there are no myometrial cysts visualized, this can be seen in the setting of adenomyosis                             Workstation performed: [id number]               Procedures  Procedures      ED Course  ED Course as of 01/03/23 1910   Tue Jan 03, 2023   1624 Spoke with OB/Gyn, states will come eval patient                                         Medical Decision Making  29-year-old female patient presenting with dysmenorrhea, nausea vomiting, increased vaginal bleeding onset yesterday  States her menstrual period came a week early  States feels like her menstruation but worse  Exam shows epigastric, suprapubic abdominal tenderness  Urine negative for pregnancy, shows blood likely secondary to vaginal bleeding  Patient treated with Toradol and Zofran with improvement of symptoms  Decided to do pelvic ultrasound to rule out torsion  Ultrasound shows right normal ovary but left ovary not able to be assessed for Doppler flow for ovarian torsion  Spoke with OB/GYN, states patient is stable to be discharged with return precautions given  States for patient to follow-up with her OB/GYN  Dysmenorrhea: acute illness or injury  Nausea and vomiting: acute illness or injury  Pelvic pain: self-limited or minor problem  Amount and/or Complexity of Data Reviewed  Labs: ordered  Radiology: ordered  Details: Ultrasound shows no evidence of torsion however left ovary shows no Doppler flow  Discussion of management or test interpretation with external provider(s): OB/GYN was consulted, did not think patient had ovarian torsion  States patient stable for follow-up with her OB/GYN  Given [place] number in case patient's symptoms return  Stable for discharge with Zofran  Return precautions given  Risk  Prescription drug management              Disposition  Final diagnoses:   Pelvic pain   Dysmenorrhea   Nausea and vomiting     Time reflects when diagnosis was documented in both MDM as applicable and the Disposition within this note     Time User Action Codes Description Comment    1/3/2023  4:30 PM Miguel CONDON HSPTL Add [R10 2] Pelvic pain     1/3/2023  5:11 PM  Larger Add [N94 6] Dysmenorrhea     1/3/2023  5:16 PM  Larger Add [R11 2] Nausea and vomiting       ED Disposition     ED Disposition   Discharge    Condition   Stable    Date/Time   Tue Harshad 3, 2023  5:11 PM    Comment   Athol Petoskey discharge to home/self care  Follow-up Information     Follow up With Specialties Details Why Contact Info Additional Information    Gil Butler,  Family Medicine Schedule an appointment as soon as possible for a visit   [address] [facility] [id number] [phone]       [facility] OB/GYN [facility] EMERGENCY [facility] - [place] and Gynecology Schedule an appointment as soon as possible for a visit   [address] [facility] [address]  [phone] [facility] [address], [address], [facility] [address] ([place] [address], [place] [facility], [address] [facility]           Discharge Medication List as of 1/3/2023  5:16 PM      START taking these medications    Details   !! ondansetron (ZOFRAN) 4 mg tablet Take 1 tablet (4 mg total) by mouth every 6 (six) hours, Starting Tue 1/3/2023, Normal       !! - Potential duplicate medications found  Please discuss with provider        CONTINUE these medications which have NOT CHANGED    Details   benzoyl peroxide 5 % gel Starting Fri 10/22/2021, Historical Med      ibuprofen (MOTRIN) 600 mg tablet Take 1 tablet (600 mg total) by mouth every 6 (six) hours as needed for mild pain, Starting Mon 5/16/2022, Normal      levonorgestrel-ethinyl estradiol (Iclevia) 0 15-0 03 MG per tablet Take 1 tablet by mouth daily, Starting Tue 4/5/2022, Normal      Loratadine 10 MG CAPS Take by mouth, Historical Med      naproxen (Naprosyn) 500 mg tablet Take 1 tablet (500 mg total) by mouth 2 (two) times a day with meals, Starting Mon 7/18/2022, Print      !! ondansetron (Zofran ODT) 4 mg disintegrating tablet Take 1 tablet (4 mg total) by mouth every 6 (six) hours as needed for nausea or vomiting, Starting Mon 5/16/2022, Normal      !! ondansetron (Zofran ODT) 4 mg disintegrating tablet Take 1 tablet (4 mg total) by mouth every 8 (eight) hours as needed for nausea or vomiting, Starting Mon 7/18/2022, Print !! ondansetron (Zofran ODT) 4 mg disintegrating tablet Take 1 tablet (4 mg total) by mouth every 6 (six) hours as needed for nausea or vomiting, Starting Wed 11/23/2022, Normal      !! ondansetron (ZOFRAN) 4 mg tablet Take 1 tablet (4 mg total) by mouth every 6 (six) hours, Starting Fri 2/11/2022, Normal      pseudoephedrine (SUDAFED) 120 MG 12 hr tablet Take 1 tablet (120 mg total) by mouth 2 (two) times a day for 10 days, Starting Wed 11/23/2022, Until Sat 12/3/2022, Normal       !! - Potential duplicate medications found  Please discuss with provider  No discharge procedures on file  PDMP Review     None           ED Provider  Attending physically available and evaluated Antonio Severin I managed the patient along with the ED Attending      Electronically Signed by         Hillary Gonzalez MD  01/03/23 9273

## 2023-01-03 NOTE — ED ATTENDING ATTESTATION
1/3/2023  ILuis Alfredo MD, saw and evaluated the patient  I have discussed the patient with the resident/non-physician practitioner and agree with the resident's/non-physician practitioner's findings, Plan of Care, and MDM as documented in the resident's/non-physician practitioner's note, except where noted  All available labs and Radiology studies were reviewed  I was present for key portions of any procedure(s) performed by the resident/non-physician practitioner and I was immediately available to provide assistance  At this point I agree with the current assessment done in the Emergency Department  I have conducted an independent evaluation of this patient a history and physical is as follows:  Pt started with menses last night one week early with bleeding is heavy and more painful than usual Pt feels sob  Pt has nv and is unable to keep po down   No fevers PE: alert uncomfortable heart reg lungs clear abd soft tender midepigastric and suprapubic MDM: will treat pain check us     reeval after meds pt still with tenderness and pain in LLQ  Will have gyn eval given us result  ED Course         Critical Care Time  Procedures

## 2023-01-05 LAB — BACTERIA UR CULT: NORMAL

## 2023-01-06 ENCOUNTER — OFFICE VISIT (OUTPATIENT)
Dept: OBGYN CLINIC | Facility: CLINIC | Age: 23
End: 2023-01-06

## 2023-01-06 VITALS
WEIGHT: 263 LBS | HEIGHT: 64 IN | DIASTOLIC BLOOD PRESSURE: 88 MMHG | SYSTOLIC BLOOD PRESSURE: 128 MMHG | BODY MASS INDEX: 44.9 KG/M2

## 2023-01-06 DIAGNOSIS — N94.6 DYSMENORRHEA: Primary | ICD-10-CM

## 2023-01-06 RX ORDER — NORETHINDRONE ACETATE AND ETHINYL ESTRADIOL 1.5-30(21)
1 KIT ORAL DAILY
Qty: 28 TABLET | Refills: 2 | Status: SHIPPED | OUTPATIENT
Start: 2023-01-06

## 2023-01-06 NOTE — PROGRESS NOTES
Subjective      Cory Akins is a 23 y o  female who presents to restart OCP for dysmenorrhea  She was seen in the ED for severe cramping with her menses on the 3rd  She was on OCP and discontinued them  She states the OCP worked in controlling her menstrual symptoms  Pertinent past medical history: none  Menstrual History:  OB History        0    Para   0    Term   0       0    AB   0    Living   0       SAB   0    IAB   0    Ectopic   0    Multiple   0    Live Births   0                  Patient's last menstrual period was 2023  The following portions of the patient's history were reviewed and updated as appropriate: allergies, current medications, past family history, past medical history, past social history, past surgical history and problem list     Review of Systems  Pertinent items are noted in HPI  Objective      /88   Ht 5' 4" (1 626 m)   Wt 119 kg (263 lb)   LMP 2023   BMI 45 14 kg/m²       Assessment     23 y o , restarting OCP (estrogen/progesterone), no contraindications  I have reviewed the risk and benefits of Oral OCP's, including the risk of blood clots, elevated BP, weight gain and Headaches  Benefits include reducing painful menses and heavy bleeding  Plan     Follow up in 2 months for OCP follow-up

## 2023-01-30 ENCOUNTER — HOSPITAL ENCOUNTER (EMERGENCY)
Facility: HOSPITAL | Age: 23
Discharge: HOME | End: 2023-01-30
Attending: EMERGENCY MEDICINE

## 2023-01-30 VITALS
DIASTOLIC BLOOD PRESSURE: 90 MMHG | TEMPERATURE: 98.1 F | HEART RATE: 70 BPM | SYSTOLIC BLOOD PRESSURE: 142 MMHG | RESPIRATION RATE: 20 BRPM | OXYGEN SATURATION: 99 %

## 2023-01-30 DIAGNOSIS — N94.6 DYSMENORRHEA: Primary | ICD-10-CM

## 2023-01-30 LAB
EXT PREGNANCY TEST URINE: NEGATIVE
EXT. CONTROL: NORMAL

## 2023-01-30 RX ORDER — KETOROLAC TROMETHAMINE 30 MG/ML
15 INJECTION, SOLUTION INTRAMUSCULAR; INTRAVENOUS ONCE
Status: COMPLETED | OUTPATIENT
Start: 2023-01-30 | End: 2023-01-30

## 2023-01-30 RX ORDER — ONDANSETRON 4 MG/1
4 TABLET, ORALLY DISINTEGRATING ORAL ONCE
Status: COMPLETED | OUTPATIENT
Start: 2023-01-30 | End: 2023-01-30

## 2023-01-30 RX ADMIN — KETOROLAC TROMETHAMINE 15 MG: 30 INJECTION, SOLUTION INTRAMUSCULAR; INTRAVENOUS at 15:35

## 2023-01-30 RX ADMIN — ONDANSETRON 4 MG: 4 TABLET, ORALLY DISINTEGRATING ORAL at 15:23

## 2023-01-30 NOTE — ED PROVIDER NOTES
History  Chief Complaint   Patient presents with   • Menstrual Problem     Pt having menstrual cramps  Last month was here and was told her "ovaries are twisted " Saw a specialist who said that wasn't the case and was placed on birth control  Patient's cramps are worse and now has her period with vomiting that started one hour  ago and reports vomiting 4 times in that hour  HPI     Patient is a 24 y/o F with PMH anemia presenting with menstrual cramping  Pt states last month she was having severe menstrual cramping, had pelvis ultrasound done and was started on birth control  States today her menstrual cramping is severe again and now associated with nausea and vomiting  Cramping started 4 days ago but menstrual period started today  Has been taking "2 ibuprofen pills every 3-4 hours " Usually helps but this time not helping much  Vomited 3 times today, last was 2 hours ago  Bleeding is not more severe than usual  Denies fevers, chills, CP, SOB, leg swelling, chance of pregnancy  Prior to Admission Medications   Prescriptions Last Dose Informant Patient Reported?  Taking?   benzoyl peroxide 5 % gel   Yes No   ibuprofen (MOTRIN) 600 mg tablet   No No   Sig: Take 1 tablet (600 mg total) by mouth every 6 (six) hours as needed for mild pain   naproxen (Naprosyn) 500 mg tablet   No No   Sig: Take 1 tablet (500 mg total) by mouth 2 (two) times a day with meals   norethindrone-ethinyl estradiol-iron (Loestrin Fe 1 5/30) 1 5-30 MG-MCG tablet   No No   Sig: Take 1 tablet by mouth daily   ondansetron (ZOFRAN) 4 mg tablet Not Taking  No No   Sig: Take 1 tablet (4 mg total) by mouth every 6 (six) hours   Patient not taking: Reported on 11/23/2022   ondansetron (ZOFRAN) 4 mg tablet Not Taking  No No   Sig: Take 1 tablet (4 mg total) by mouth every 6 (six) hours   Patient not taking: Reported on 1/6/2023   ondansetron (Zofran ODT) 4 mg disintegrating tablet   No No   Sig: Take 1 tablet (4 mg total) by mouth every 6 (six) hours as needed for nausea or vomiting   pseudoephedrine (SUDAFED) 120 MG 12 hr tablet   No No   Sig: Take 1 tablet (120 mg total) by mouth 2 (two) times a day for 10 days      Facility-Administered Medications: None       Past Medical History:   Diagnosis Date   • Anemia    • Seasonal allergic reaction        History reviewed  No pertinent surgical history  History reviewed  No pertinent family history  I have reviewed and agree with the history as documented  E-Cigarette/Vaping   • E-Cigarette Use Never User      E-Cigarette/Vaping Substances   • Nicotine No    • THC No    • CBD No    • Flavoring No    • Other No    • Unknown No      Social History     Tobacco Use   • Smoking status: Never   • Smokeless tobacco: Never   Vaping Use   • Vaping Use: Never used   Substance Use Topics   • Alcohol use: Never   • Drug use: Never        Review of Systems   Constitutional: Negative for chills and fever  HENT: Negative for ear pain and sore throat  Eyes: Negative for pain and visual disturbance  Respiratory: Negative for cough and shortness of breath  Cardiovascular: Negative for chest pain and palpitations  Gastrointestinal: Positive for nausea and vomiting  Negative for abdominal pain  Genitourinary: Positive for menstrual problem  Negative for dysuria and hematuria  Musculoskeletal: Negative for arthralgias and back pain  Skin: Negative for color change and rash  Neurological: Negative for seizures and syncope  All other systems reviewed and are negative        Physical Exam  ED Triage Vitals   Temperature Pulse Respirations Blood Pressure SpO2   01/30/23 1453 01/30/23 1453 01/30/23 1453 01/30/23 1455 01/30/23 1453   98 1 °F (36 7 °C) 70 20 142/90 99 %      Temp Source Heart Rate Source Patient Position - Orthostatic VS BP Location FiO2 (%)   01/30/23 1453 01/30/23 1453 01/30/23 1453 01/30/23 1453 --   Oral Monitor Sitting Left arm       Pain Score       01/30/23 1453       8 Orthostatic Vital Signs  Vitals:    01/30/23 1453 01/30/23 1455   BP:  142/90   Pulse: 70    Patient Position - Orthostatic VS: Sitting        Physical Exam  Vitals and nursing note reviewed  Constitutional:       General: She is not in acute distress  Appearance: She is not ill-appearing or toxic-appearing  HENT:      Head: Normocephalic and atraumatic  Right Ear: External ear normal       Left Ear: External ear normal       Nose: Nose normal       Mouth/Throat:      Pharynx: Oropharynx is clear  Eyes:      Extraocular Movements: Extraocular movements intact  Pupils: Pupils are equal, round, and reactive to light  Cardiovascular:      Rate and Rhythm: Normal rate and regular rhythm  Pulses: Normal pulses  Heart sounds: Normal heart sounds  No murmur heard  No friction rub  No gallop  Pulmonary:      Effort: Pulmonary effort is normal  No respiratory distress  Breath sounds: Normal breath sounds  No wheezing, rhonchi or rales  Abdominal:      General: Abdomen is flat  There is no distension  Palpations: Abdomen is soft  Tenderness: There is abdominal tenderness  There is no guarding or rebound  Comments: Mild suprapubic tenderness   Musculoskeletal:         General: No deformity  Normal range of motion  Cervical back: Normal range of motion  Right lower leg: No edema  Left lower leg: No edema  Skin:     General: Skin is warm and dry  Capillary Refill: Capillary refill takes less than 2 seconds  Findings: No rash  Neurological:      General: No focal deficit present  Mental Status: She is alert and oriented to person, place, and time        Gait: Gait normal    Psychiatric:         Mood and Affect: Mood normal          ED Medications  Medications   ondansetron (ZOFRAN-ODT) dispersible tablet 4 mg (4 mg Oral Given 1/30/23 1523)   ketorolac (TORADOL) injection 15 mg (15 mg Intramuscular Given 1/30/23 1535) Diagnostic Studies  Results Reviewed     Procedure Component Value Units Date/Time    POCT pregnancy, urine [[id number]]  (Normal) Resulted: 01/30/23 1544    Lab Status: Final result Updated: 01/30/23 1544     EXT Preg Test, Ur Negative     Control Valid                 No orders to display         Procedures  Procedures      ED Course  ED Course as of 01/30/23 1612   Mon Jan 30, 2023   1553 PREGNANCY TEST URINE: Negative                                       Medical Decision Making  24 y/o F presenting with recurrent severe menstrual cramping  Vitals stable  Exam with mild abd tenderness  Previous records reviewed including pelvis U/S from earlier this month  Doubt ovarian torsion based on normal ovary size at recent ultrasound  Suspect likely endometriosis vs adenomyosis  Urine pregnancy test negative  Pt has ob/gyn f/u but requesting new provider, referral to be placed  Treated in ED with antiemetic and pain control  Pt feeling improved with toradol  Will discharge at this time  Pt already on OCP and ibuprofen  Strict return precautions given  Dysmenorrhea: complicated acute illness or injury  Amount and/or Complexity of Data Reviewed  Labs: ordered  Decision-making details documented in ED Course  Risk  Prescription drug management  Disposition  Final diagnoses:   Dysmenorrhea     Time reflects when diagnosis was documented in both MDM as applicable and the Disposition within this note     Time User Action Codes Description Comment    1/30/2023  3:55 PM Pooja Chauhan Add [N94 6] Dysmenorrhea       ED Disposition     ED Disposition   Discharge    Condition   Stable    Date/Time   Mon Jan 30, 2023  3:57 PM    Comment   Bin Hadley discharge to home/self care                 Follow-up Information     Follow up With Specialties Details Why Contact Info [address] Family Medicine   [address] [facility] [id number] [address] [facility]   [facility] [address] [address]  [facility] Latricia [place], [place], [address] [phone]          Patient's Medications   Discharge Prescriptions    No medications on file         PDMP Review     None           ED Provider  Attending physically available and evaluated Mary Anne Blankenship I managed the patient along with the ED Attending      Electronically Signed by         Jose Juan Harrell MD  01/30/23 1400

## 2023-01-30 NOTE — Clinical Note
Karma Ruiz was seen and treated in our emergency department on 1/30/2023  Diagnosis: Abdominal pain    Mica Haddad  may return to work on return date  She may return on this date: 01/31/2023         If you have any questions or concerns, please don't hesitate to call        Carrie Lindsay MD    ______________________________           _______________          _______________  Hospital Representative                              Date                                Time

## 2023-01-31 ENCOUNTER — HOSPITAL ENCOUNTER (EMERGENCY)
Facility: HOSPITAL | Age: 23
Discharge: HOME | End: 2023-01-31
Attending: EMERGENCY MEDICINE

## 2023-01-31 VITALS
DIASTOLIC BLOOD PRESSURE: 95 MMHG | SYSTOLIC BLOOD PRESSURE: 171 MMHG | OXYGEN SATURATION: 100 % | RESPIRATION RATE: 16 BRPM | HEART RATE: 66 BPM | TEMPERATURE: 98.1 F

## 2023-01-31 DIAGNOSIS — R11.2 NAUSEA AND VOMITING: ICD-10-CM

## 2023-01-31 DIAGNOSIS — N94.6 DYSMENORRHEA: ICD-10-CM

## 2023-01-31 DIAGNOSIS — N39.0 UTI (URINARY TRACT INFECTION): Primary | ICD-10-CM

## 2023-01-31 LAB
ALBUMIN SERPL BCP-MCNC: 4 G/DL (ref 3.5–5)
ALP SERPL-CCNC: 64 U/L (ref 34–104)
ALT SERPL W P-5'-P-CCNC: 14 U/L (ref 7–52)
ANION GAP SERPL CALCULATED.3IONS-SCNC: 6 MMOL/L (ref 4–13)
AST SERPL W P-5'-P-CCNC: 21 U/L (ref 13–39)
BACTERIA UR QL AUTO: ABNORMAL /HPF
BASOPHILS # BLD AUTO: 0.02 THOUSANDS/ÂΜL (ref 0–0.1)
BASOPHILS NFR BLD AUTO: 0 % (ref 0–1)
BILIRUB SERPL-MCNC: 0.38 MG/DL (ref 0.2–1)
BILIRUB UR QL STRIP: NEGATIVE
BUN SERPL-MCNC: 12 MG/DL (ref 5–25)
C TRACH DNA SPEC QL NAA+PROBE: POSITIVE
CALCIUM SERPL-MCNC: 9.2 MG/DL (ref 8.4–10.2)
CHLORIDE SERPL-SCNC: 105 MMOL/L (ref 96–108)
CLARITY UR: CLEAR
CO2 SERPL-SCNC: 24 MMOL/L (ref 21–32)
COLOR UR: COLORLESS
CREAT SERPL-MCNC: 0.82 MG/DL (ref 0.6–1.3)
EOSINOPHIL # BLD AUTO: 0 THOUSAND/ÂΜL (ref 0–0.61)
EOSINOPHIL NFR BLD AUTO: 0 % (ref 0–6)
ERYTHROCYTE [DISTWIDTH] IN BLOOD BY AUTOMATED COUNT: 17.3 % (ref 11.6–15.1)
GFR SERPL CREATININE-BSD FRML MDRD: 103 ML/MIN/1.73SQ M
GLUCOSE SERPL-MCNC: 117 MG/DL (ref 65–140)
GLUCOSE UR STRIP-MCNC: NEGATIVE MG/DL
HCT VFR BLD AUTO: 35.7 % (ref 34.8–46.1)
HGB BLD-MCNC: 11 G/DL (ref 11.5–15.4)
HGB UR QL STRIP.AUTO: ABNORMAL
IMM GRANULOCYTES # BLD AUTO: 0.01 THOUSAND/UL (ref 0–0.2)
IMM GRANULOCYTES NFR BLD AUTO: 0 % (ref 0–2)
KETONES UR STRIP-MCNC: NEGATIVE MG/DL
LEUKOCYTE ESTERASE UR QL STRIP: ABNORMAL
LYMPHOCYTES # BLD AUTO: 0.94 THOUSANDS/ÂΜL (ref 0.6–4.47)
LYMPHOCYTES NFR BLD AUTO: 13 % (ref 14–44)
MCH RBC QN AUTO: 24.1 PG (ref 26.8–34.3)
MCHC RBC AUTO-ENTMCNC: 30.8 G/DL (ref 31.4–37.4)
MCV RBC AUTO: 78 FL (ref 82–98)
MONOCYTES # BLD AUTO: 0.29 THOUSAND/ÂΜL (ref 0.17–1.22)
MONOCYTES NFR BLD AUTO: 4 % (ref 4–12)
N GONORRHOEA DNA SPEC QL NAA+PROBE: NEGATIVE
NEUTROPHILS # BLD AUTO: 6.03 THOUSANDS/ÂΜL (ref 1.85–7.62)
NEUTS SEG NFR BLD AUTO: 83 % (ref 43–75)
NITRITE UR QL STRIP: NEGATIVE
NON-SQ EPI CELLS URNS QL MICRO: ABNORMAL /HPF
NRBC BLD AUTO-RTO: 0 /100 WBCS
PH UR STRIP.AUTO: 8.5 [PH]
PLATELET # BLD AUTO: 297 THOUSANDS/UL (ref 149–390)
PMV BLD AUTO: 10.9 FL (ref 8.9–12.7)
POTASSIUM SERPL-SCNC: 3.9 MMOL/L (ref 3.5–5.3)
PROT SERPL-MCNC: 8.4 G/DL (ref 6.4–8.4)
PROT UR STRIP-MCNC: ABNORMAL MG/DL
RBC # BLD AUTO: 4.56 MILLION/UL (ref 3.81–5.12)
RBC #/AREA URNS AUTO: ABNORMAL /HPF
SODIUM SERPL-SCNC: 135 MMOL/L (ref 135–147)
SP GR UR STRIP.AUTO: 1.02 (ref 1–1.03)
UROBILINOGEN UR STRIP-ACNC: <2 MG/DL
WBC # BLD AUTO: 7.29 THOUSAND/UL (ref 4.31–10.16)
WBC #/AREA URNS AUTO: ABNORMAL /HPF

## 2023-01-31 RX ORDER — SULFAMETHOXAZOLE AND TRIMETHOPRIM 800; 160 MG/1; MG/1
1 TABLET ORAL 2 TIMES DAILY
Qty: 14 TABLET | Refills: 0 | Status: SHIPPED | OUTPATIENT
Start: 2023-01-31 | End: 2023-02-07

## 2023-01-31 RX ORDER — ONDANSETRON 2 MG/ML
4 INJECTION INTRAMUSCULAR; INTRAVENOUS ONCE
Status: COMPLETED | OUTPATIENT
Start: 2023-01-31 | End: 2023-01-31

## 2023-01-31 RX ORDER — KETOROLAC TROMETHAMINE 30 MG/ML
15 INJECTION, SOLUTION INTRAMUSCULAR; INTRAVENOUS ONCE
Status: COMPLETED | OUTPATIENT
Start: 2023-01-31 | End: 2023-01-31

## 2023-01-31 RX ORDER — ONDANSETRON 4 MG/1
4 TABLET, FILM COATED ORAL EVERY 8 HOURS PRN
Qty: 30 TABLET | Refills: 0 | Status: SHIPPED | OUTPATIENT
Start: 2023-01-31 | End: 2023-02-14

## 2023-01-31 RX ORDER — ACETAMINOPHEN 325 MG/1
650 TABLET ORAL ONCE
Status: COMPLETED | OUTPATIENT
Start: 2023-01-31 | End: 2023-01-31

## 2023-01-31 RX ADMIN — ONDANSETRON 4 MG: 2 INJECTION INTRAMUSCULAR; INTRAVENOUS at 04:28

## 2023-01-31 RX ADMIN — SODIUM CHLORIDE 500 ML: 0.9 INJECTION, SOLUTION INTRAVENOUS at 05:01

## 2023-01-31 RX ADMIN — ONDANSETRON 4 MG: 2 INJECTION INTRAMUSCULAR; INTRAVENOUS at 07:33

## 2023-01-31 RX ADMIN — KETOROLAC TROMETHAMINE 15 MG: 30 INJECTION, SOLUTION INTRAMUSCULAR at 04:28

## 2023-01-31 RX ADMIN — CEFTRIAXONE SODIUM 1000 MG: 10 INJECTION, POWDER, FOR SOLUTION INTRAVENOUS at 07:50

## 2023-01-31 RX ADMIN — KETOROLAC TROMETHAMINE 15 MG: 30 INJECTION, SOLUTION INTRAMUSCULAR; INTRAVENOUS at 08:20

## 2023-01-31 RX ADMIN — ACETAMINOPHEN 650 MG: 325 TABLET ORAL at 08:19

## 2023-01-31 NOTE — ED PROVIDER NOTES
History  Chief Complaint   Patient presents with   • Abdominal Pain     Pt presents to the ED with c/o abdominal cramping  Day 1 of period, also nauseous/dizzy  Pt admitted for menstrual cramps before  HPI     Prior to Admission Medications   Prescriptions Last Dose Informant Patient Reported? Taking?   benzoyl peroxide 5 % gel   Yes No   ibuprofen (MOTRIN) 600 mg tablet   No No   Sig: Take 1 tablet (600 mg total) by mouth every 6 (six) hours as needed for mild pain   naproxen (Naprosyn) 500 mg tablet   No No   Sig: Take 1 tablet (500 mg total) by mouth 2 (two) times a day with meals   norethindrone-ethinyl estradiol-iron (Loestrin Fe 1 5/30) 1 5-30 MG-MCG tablet   No No   Sig: Take 1 tablet by mouth daily   ondansetron (ZOFRAN) 4 mg tablet   No No   Sig: Take 1 tablet (4 mg total) by mouth every 6 (six) hours   Patient not taking: Reported on 11/23/2022   ondansetron (ZOFRAN) 4 mg tablet   No No   Sig: Take 1 tablet (4 mg total) by mouth every 6 (six) hours   Patient not taking: Reported on 1/6/2023   ondansetron (Zofran ODT) 4 mg disintegrating tablet   No No   Sig: Take 1 tablet (4 mg total) by mouth every 6 (six) hours as needed for nausea or vomiting   pseudoephedrine (SUDAFED) 120 MG 12 hr tablet   No No   Sig: Take 1 tablet (120 mg total) by mouth 2 (two) times a day for 10 days      Facility-Administered Medications: None       Past Medical History:   Diagnosis Date   • Anemia    • Seasonal allergic reaction        History reviewed  No pertinent surgical history  History reviewed  No pertinent family history  I have reviewed and agree with the history as documented      E-Cigarette/Vaping   • E-Cigarette Use Never User      E-Cigarette/Vaping Substances   • Nicotine No    • THC No    • CBD No    • Flavoring No    • Other No    • Unknown No      Social History     Tobacco Use   • Smoking status: Never   • Smokeless tobacco: Never   Vaping Use   • Vaping Use: Never used   Substance Use Topics   • Alcohol use: Never   • Drug use: Yes     Types: Marijuana        Review of Systems    Physical Exam  ED Triage Vitals [01/31/23 0343]   Temperature Pulse Respirations Blood Pressure SpO2   98 1 °F (36 7 °C) 73 16 (!) 174/105 100 %      Temp Source Heart Rate Source Patient Position - Orthostatic VS BP Location FiO2 (%)   Oral Monitor -- Right arm --      Pain Score       10 - Worst Possible Pain             Orthostatic Vital Signs  Vitals:    01/31/23 0343   BP: (!) 174/105   Pulse: 73       Physical Exam    ED Medications  Medications - No data to display    Diagnostic Studies  Results Reviewed     None                 No orders to display         Procedures  Procedures      ED Course                                       MDM      Disposition  Final diagnoses:   None     ED Disposition     None      Follow-up Information    None         Patient's Medications   Discharge Prescriptions    No medications on file     No discharge procedures on file  PDMP Review       Value Time User    PDMP Reviewed  Yes 1/31/2023  3:35 AM Duncan Maloney MD           ED Provider  Attending physically available and evaluated Jeffery Johnston  I managed the patient along with the ED Attending      Electronically Signed by

## 2023-01-31 NOTE — ED ATTENDING ATTESTATION
1/31/2023  IRodrigo MD, saw and evaluated the patient  I have discussed the patient with the resident/non-physician practitioner and agree with the resident's/non-physician practitioner's findings, Plan of Care, and MDM as documented in the resident's/non-physician practitioner's note, except where noted  All available labs and Radiology studies were reviewed  I was present for key portions of any procedure(s) performed by the resident/non-physician practitioner and I was immediately available to provide assistance  At this point I agree with the current assessment done in the Emergency Department  I have conducted an independent evaluation of this patient a history and physical is as follows: Patient is a 23year old female with worsening vaginal bleeding and crampy pelvic pain with N/V tonight  Was last seen at [address] [place] [address] ED on 1/30/23 for dysmenorrhea and had negative urine HCG  [facility] website checked on this patient and no Rx found  NCAT  Conjunctiva somewhat pink  Mucous membranes moist  Lungs clear  Heart regular without murmur  Abdomen soft and nontender  Good bowel sounds  No rash noted  DDx including but not limited to: Menorrhagia, dysmenorrhea, anemia, coagulopathy, DUB, UTI; doubt tumor, retained products of conception, PCOS; doubt ovarian torsion or ruptured ovarian cyst or acute surgical intraabdominal process  toradol and zofran ordered  Will check labs       ED Course         Critical Care Time  Procedures

## 2023-01-31 NOTE — DISCHARGE INSTRUCTIONS
Follow-up with OBGYN as scheduled  Take the Bactrim sent to the pharmacy for the urinary tract infection   the zofran in order stop the nausea then take the motrin to control the pain  Return to the ED as needed for pain, bleeding, fevers, etc  Drink plenty of fluids

## 2023-01-31 NOTE — ED PROVIDER NOTES
History  Chief Complaint   Patient presents with   • Abdominal Pain     Pt presents to the ED with c/o abdominal cramping  Day 1 of period, also nauseous/dizzy  Pt admitted for menstrual cramps before  HPI patient is a 79-year-old female presenting today with dysmenorrhea, nausea/vomiting that began yesterday with the onset of menses  Patient has similar symptoms with her irregular periods  Has heavy bleeding, in addition to painful pelvic pain  Patient seen in ED yesterday for same issue  Symptoms resolved after Zofran and Toradol  She had gone to work this evening but did not have any Zofran and so was unable to tolerate Motrin  Began vomiting, and the pain returned due to the lack of Motrin  She states this is not out of the ordinary for her and was recently started on a birth control  She has OB/GYN follow-up already scheduled  No vaginal discharge other than usual bleeding with menses  Pregnancy test negative today  Prior to Admission Medications   Prescriptions Last Dose Informant Patient Reported?  Taking?   benzoyl peroxide 5 % gel   Yes No   ibuprofen (MOTRIN) 600 mg tablet   No No   Sig: Take 1 tablet (600 mg total) by mouth every 6 (six) hours as needed for mild pain   naproxen (Naprosyn) 500 mg tablet   No No   Sig: Take 1 tablet (500 mg total) by mouth 2 (two) times a day with meals   norethindrone-ethinyl estradiol-iron (Loestrin Fe 1 5/30) 1 5-30 MG-MCG tablet   No No   Sig: Take 1 tablet by mouth daily   ondansetron (ZOFRAN) 4 mg tablet   No No   Sig: Take 1 tablet (4 mg total) by mouth every 6 (six) hours   Patient not taking: Reported on 11/23/2022   ondansetron (ZOFRAN) 4 mg tablet   No No   Sig: Take 1 tablet (4 mg total) by mouth every 6 (six) hours   Patient not taking: Reported on 1/6/2023   ondansetron (Zofran ODT) 4 mg disintegrating tablet   No No   Sig: Take 1 tablet (4 mg total) by mouth every 6 (six) hours as needed for nausea or vomiting   pseudoephedrine (SUDAFED) 120 MG 12 hr tablet   No No   Sig: Take 1 tablet (120 mg total) by mouth 2 (two) times a day for 10 days      Facility-Administered Medications: None       Past Medical History:   Diagnosis Date   • Anemia    • Seasonal allergic reaction        History reviewed  No pertinent surgical history  History reviewed  No pertinent family history  I have reviewed and agree with the history as documented  E-Cigarette/Vaping   • E-Cigarette Use Never User      E-Cigarette/Vaping Substances   • Nicotine No    • THC No    • CBD No    • Flavoring No    • Other No    • Unknown No      Social History     Tobacco Use   • Smoking status: Never   • Smokeless tobacco: Never   Vaping Use   • Vaping Use: Never used   Substance Use Topics   • Alcohol use: Never   • Drug use: Yes     Types: Marijuana        Review of Systems   Constitutional: Positive for fatigue (Usual)  Negative for chills and fever  HENT: Negative for congestion, rhinorrhea and sore throat  Eyes: Negative for photophobia, pain, redness and visual disturbance  Respiratory: Negative for shortness of breath and wheezing  Cardiovascular: Negative for chest pain, palpitations and leg swelling  Gastrointestinal: Positive for abdominal pain (Suprapubic)  Negative for abdominal distention, constipation, diarrhea, nausea and vomiting  Genitourinary: Positive for menstrual problem, vaginal bleeding and vaginal pain  Negative for difficulty urinating, dysuria and flank pain  Musculoskeletal: Negative for neck pain and neck stiffness  Skin: Negative for rash  Neurological: Negative for seizures, syncope, facial asymmetry, speech difficulty, weakness, light-headedness, numbness and headaches  Psychiatric/Behavioral: Negative for confusion  All other systems reviewed and are negative        Physical Exam  ED Triage Vitals   Temperature Pulse Respirations Blood Pressure SpO2   01/31/23 0343 01/31/23 0343 01/31/23 0343 01/31/23 0343 01/31/23 0343   98 1 °F (36 7 °C) 73 16 (!) 174/105 100 %      Temp Source Heart Rate Source Patient Position - Orthostatic VS BP Location FiO2 (%)   01/31/23 0343 01/31/23 0343 01/31/23 0652 01/31/23 0343 --   Oral Monitor Lying Right arm       Pain Score       01/31/23 0343       10 - Worst Possible Pain             Orthostatic Vital Signs  Vitals:    01/31/23 0343 01/31/23 0652   BP: (!) 174/105 (!) 171/95   Pulse: 73 66   Patient Position - Orthostatic VS:  Lying       Physical Exam  Vitals and nursing note reviewed  Constitutional:       General: She is in acute distress (Mild due to pain, discomfort)  Appearance: She is well-developed  She is not ill-appearing, toxic-appearing or diaphoretic  HENT:      Head: Normocephalic and atraumatic  Mouth/Throat:      Pharynx: Oropharynx is clear  No pharyngeal swelling or oropharyngeal exudate  Eyes:      General: No scleral icterus  Extraocular Movements: Extraocular movements intact  Pupils: Pupils are equal, round, and reactive to light  Cardiovascular:      Rate and Rhythm: Normal rate and regular rhythm  Heart sounds: Normal heart sounds  No murmur heard  No friction rub  No gallop  Pulmonary:      Effort: Pulmonary effort is normal  No respiratory distress  Breath sounds: Normal breath sounds  No stridor  No wheezing, rhonchi or rales  Chest:      Chest wall: No tenderness  Abdominal:      General: Abdomen is protuberant  Bowel sounds are normal  There is no distension or abdominal bruit  There are no signs of injury  Palpations: Abdomen is soft  There is no shifting dullness, fluid wave, hepatomegaly, splenomegaly, mass or pulsatile mass  Tenderness: There is abdominal tenderness in the suprapubic area  There is no right CVA tenderness, left CVA tenderness, guarding or rebound  Negative signs include Matthew's sign, Rovsing's sign, McBurney's sign, psoas sign and obturator sign  Hernia: No hernia is present     Skin: General: Skin is warm and dry  Coloration: Skin is not cyanotic, jaundiced, mottled or pale  Findings: No erythema or rash  Neurological:      General: No focal deficit present  Mental Status: She is alert and oriented to person, place, and time  Motor: No weakness  Psychiatric:         Mood and Affect: Mood normal          Behavior: Behavior normal          ED Medications  Medications   ketorolac (TORADOL) injection 15 mg (15 mg Intravenous Given 1/31/23 0428)   ondansetron (ZOFRAN) injection 4 mg (4 mg Intravenous Given 1/31/23 0428)   sodium chloride 0 9 % bolus 500 mL (0 mL Intravenous Stopped 1/31/23 0611)   ondansetron (ZOFRAN) injection 4 mg (4 mg Intravenous Given 1/31/23 0733)   ceftriaxone (ROCEPHIN) 1 g/50 mL in dextrose IVPB (0 mg Intravenous Stopped 1/31/23 0838)   ketorolac (TORADOL) injection 15 mg (15 mg Intravenous Given 1/31/23 0820)   acetaminophen (TYLENOL) tablet 650 mg (650 mg Oral Given 1/31/23 0819)       Diagnostic Studies  Results Reviewed     Procedure Component Value Units Date/Time    Urine Microscopic [[id number]]  (Abnormal) Collected: 01/31/23 0538    Lab Status: Final result Specimen: Urine, Clean Catch Updated: 01/31/23 0619     RBC, UA 4-10 /hpf      WBC, UA 10-20 /hpf      Epithelial Cells Occasional /hpf      Bacteria, UA Occasional /hpf     Urine culture [[id number]] Collected: 01/31/23 0538    Lab Status:  In process Specimen: Urine, Clean Catch Updated: 01/31/23 0619    UA w Reflex to Microscopic w Reflex to Culture [[id number]]  (Abnormal) Collected: 01/31/23 0538    Lab Status: Final result Specimen: Urine, Clean Catch Updated: 01/31/23 0611     Color, UA Colorless     Clarity, UA Clear     Specific Gravity, UA 1 016     pH, UA 8 5     Leukocytes, UA Trace     Nitrite, UA Negative     Protein, UA Trace mg/dl      Glucose, UA Negative mg/dl      Ketones, UA Negative mg/dl      Urobilinogen, UA <2 0 mg/dl      Bilirubin, UA Negative     Occult Blood, UA Large    Chlamydia/GC amplified DNA by PCR [[id number]] Collected: 01/31/23 0538    Lab Status:  In process Specimen: Urine, Other Updated: 01/31/23 0542    Comprehensive metabolic panel [[id number]] Collected: 01/31/23 0459    Lab Status: Final result Specimen: Blood from Arm, Left Updated: 01/31/23 0535     Sodium 135 mmol/L      Potassium 3 9 mmol/L      Chloride 105 mmol/L      CO2 24 mmol/L      ANION GAP 6 mmol/L      BUN 12 mg/dL      Creatinine 0 82 mg/dL      Glucose 117 mg/dL      Calcium 9 2 mg/dL      AST 21 U/L      ALT 14 U/L      Alkaline Phosphatase 64 U/L      Total Protein 8 4 g/dL      Albumin 4 0 g/dL      Total Bilirubin 0 38 mg/dL      eGFR 103 ml/min/1 73sq m     Narrative:      National Kid[employer] Disease Foundation guidelines for Chronic Kidney Disease (CKD):   •  Stage 1 with normal or high GFR (GFR > 90 mL/min/1 73 square meters)  •  Stage 2 Mild CKD (GFR = 60-89 mL/min/1 73 square meters)  •  Stage 3A Moderate CKD (GFR = 45-59 mL/min/1 73 square meters)  •  Stage 3B Moderate CKD (GFR = 30-44 mL/min/1 73 square meters)  •  Stage 4 Severe CKD (GFR = 15-29 mL/min/1 73 square meters)  •  Stage 5 End Stage CKD (GFR <15 mL/min/1 73 square meters)  Note: GFR calculation is accurate only with a steady state creatinine    CBC and differential [[id number]]  (Abnormal) Collected: 01/31/23 0459    Lab Status: Final result Specimen: Blood from Arm, Left Updated: 01/31/23 0513     WBC 7 29 Thousand/uL      RBC 4 56 Million/uL      Hemoglobin 11 0 g/dL      Hematocrit 35 7 %      MCV 78 fL      MCH 24 1 pg      MCHC 30 8 g/dL      RDW 17 3 %      MPV 10 9 fL      Platelets 005 Thousands/uL      nRBC 0 /100 WBCs      Neutrophils Relative 83 %      Immat GRANS % 0 %      Lymphocytes Relative 13 %      Monocytes Relative 4 %      Eosinophils Relative 0 %      Basophils Relative 0 %      Neutrophils Absolute 6 03 Thousands/µL      Immature Grans Absolute 0 01 Thousand/uL      Lymphocytes Absolute 0 94 Thousands/µL Monocytes Absolute 0 29 Thousand/µL      Eosinophils Absolute 0 00 Thousand/µL      Basophils Absolute 0 02 Thousands/µL                  No orders to display         Procedures  Procedures      ED Course     Zofran 4 mg ordered  IV 15 mg Toradol ordered  Patient feels much improved  Still slightly nauseated though  Will continue to observe for p o  challenge  Second Zofran 4 mg ordered  Second IV 50 mg Toradol ordered  Tylenol ordered  Patient tolerated p o  Tylenol with fluid  She is feeling much better  IV Rocephin given  Will continue to treat UTI outpatient with Bactrim  Also Rx for nausea with Zofran  Discussed return precautions, patient is agreeable to plan will otherwise follow-up with the OB/GYN as scheduled if no need to return to ED  Vitals stable and within normal limits  MDM     Dysmenorrhea, menometrorrhagia, menorrhagia, adenomyosis, UTI, PID, Nausea, vomiting, anemia  Hemoglobin similar to previous  UA showed leukocytosis in addition to her usual symptoms of dysmenorrhea  Patient's nausea and vomiting resolved after second round of 4 mg Zofran IV  Patient's pain was well controlled after first IV Toradol 15 mg  Pain was then medicated further after a second round 3 hours later  Disposition  Final diagnoses:   UTI (urinary tract infection)   Dysmenorrhea   Nausea and vomiting     Time reflects when diagnosis was documented in both MDM as applicable and the Disposition within this note     Time User Action Codes Description Comment    1/31/2023  7:41 AM Cuca Daring Add [N39 0] UTI (urinary tract infection)     1/31/2023  7:41 AM Cuca Daring Add [N94 6] Dysmenorrhea     1/31/2023  7:41 AM Cuca Daring Add [R11 2] Nausea and vomiting       ED Disposition     ED Disposition   Discharge    Condition   Stable    Date/Time   Tue Jan 31, 2023  7:39 AM    Comment   Keira Lagos discharge to home/self care  Follow-up Information     Follow up With Specialties Details Why Contact Info Additional Information    Danuta Johnson DO Family Medicine  As needed [address] [id number] [phone]       [address] Emergency Medicine  As needed [address] Emergency Department, [address], [place], [address] [facility], [address]          Patient's Medications   Discharge Prescriptions    ONDANSETRON (ZOFRAN) 4 MG TABLET    Take 1 tablet (4 mg total) by mouth every 8 (eight) hours as needed for nausea or vomiting for up to 14 days       Start Date: 1/31/2023 End Date: 2/14/2023       Order Dose: 4 mg       Quantity: 30 tablet    Refills: 0    SULFAMETHOXAZOLE-TRIMETHOPRIM (BACTRIM DS) 800-160 MG PER TABLET    Take 1 tablet by mouth 2 (two) times a day for 7 days smx-tmp DS (BACTRIM) 800-160 mg tabs (1tab q12 D10)       Start Date: 1/31/2023 End Date: 2/7/2023       Order Dose: 1 tablet       Quantity: 14 tablet    Refills: 0     No discharge procedures on file  PDMP Review       Value Time User    PDMP Reviewed  Yes 1/31/2023  3:35 AM Lo Townsend MD           ED Provider  Attending physically available and evaluated Junito Casey  I managed the patient along with the ED Attending      Electronically Signed by         Shruthi Hogan DO  01/31/23 9784

## 2023-02-01 LAB — BACTERIA UR CULT: NORMAL

## 2023-02-03 NOTE — RESULT ENCOUNTER NOTE
3rd attempt  Please send letter  "Call cannot be completed"    Called mom who was listed as an emergency contact- she hasn't spoken to patient in > 1 year and she asked to be removed from her list  Called dad, no answer  LM for patient to call back ED

## 2023-02-06 NOTE — ED ATTENDING ATTESTATION
1/30/2023  Indu SADLER DO, saw and evaluated the patient  I have discussed the patient with the resident/non-physician practitioner and agree with the resident's/non-physician practitioner's findings, Plan of Care, and MDM as documented in the resident's/non-physician practitioner's note, except where noted  All available labs and Radiology studies were reviewed  I was present for key portions of any procedure(s) performed by the resident/non-physician practitioner and I was immediately available to provide assistance  At this point I agree with the current assessment done in the Emergency Department  I have conducted an independent evaluation of this patient a history and physical is as follows:    23year old female p/w pelvic cramping  Sexually active  Hx of ovarian "twisting" althought reviewed records and never had torsion  Follows with obgyn but doenst like provider  Denies vaginal discharge  On exam tender in lower abd   Defers pelvic exam  Plan urine preg, ua  F/u obgyn    ED Course         Critical Care Time  Procedures

## 2023-02-26 ENCOUNTER — HOSPITAL ENCOUNTER (EMERGENCY)
Facility: HOSPITAL | Age: 23
Discharge: HOME | End: 2023-02-26
Attending: EMERGENCY MEDICINE

## 2023-02-26 VITALS
OXYGEN SATURATION: 100 % | SYSTOLIC BLOOD PRESSURE: 146 MMHG | HEART RATE: 77 BPM | RESPIRATION RATE: 18 BRPM | DIASTOLIC BLOOD PRESSURE: 93 MMHG | TEMPERATURE: 98.8 F | BODY MASS INDEX: 42.46 KG/M2 | WEIGHT: 247.36 LBS

## 2023-02-26 DIAGNOSIS — N94.6 DYSMENORRHEA: ICD-10-CM

## 2023-02-26 DIAGNOSIS — R10.9 ABDOMINAL CRAMPING: Primary | ICD-10-CM

## 2023-02-26 LAB
BACTERIA UR QL AUTO: ABNORMAL /HPF
BILIRUB UR QL STRIP: NEGATIVE
CLARITY UR: CLEAR
COLOR UR: ABNORMAL
EXT PREGNANCY TEST URINE: NEGATIVE
EXT. CONTROL: NORMAL
GLUCOSE UR STRIP-MCNC: NEGATIVE MG/DL
HGB UR QL STRIP.AUTO: 250
KETONES UR STRIP-MCNC: NEGATIVE MG/DL
LEUKOCYTE ESTERASE UR QL STRIP: 25
MUCOUS THREADS UR QL AUTO: ABNORMAL
NITRITE UR QL STRIP: NEGATIVE
NON-SQ EPI CELLS URNS QL MICRO: ABNORMAL /HPF
PH UR STRIP.AUTO: 7 [PH]
PROT UR STRIP-MCNC: NEGATIVE MG/DL
RBC #/AREA URNS AUTO: ABNORMAL /HPF
SP GR UR STRIP.AUTO: 1.01 (ref 1–1.04)
UROBILINOGEN UA: NEGATIVE MG/DL
WBC #/AREA URNS AUTO: ABNORMAL /HPF

## 2023-02-26 RX ORDER — KETOROLAC TROMETHAMINE 30 MG/ML
15 INJECTION, SOLUTION INTRAMUSCULAR; INTRAVENOUS ONCE
Status: COMPLETED | OUTPATIENT
Start: 2023-02-26 | End: 2023-02-26

## 2023-02-26 RX ORDER — DROSPIRENONE AND ETHINYL ESTRADIOL 0.03MG-3MG
1 KIT ORAL DAILY
Qty: 28 TABLET | Refills: 0 | Status: SHIPPED | OUTPATIENT
Start: 2023-02-26 | End: 2023-03-26

## 2023-02-26 RX ORDER — NAPROXEN 500 MG/1
500 TABLET ORAL 2 TIMES DAILY WITH MEALS
Qty: 30 TABLET | Refills: 0 | Status: SHIPPED | OUTPATIENT
Start: 2023-02-26

## 2023-02-26 RX ADMIN — KETOROLAC TROMETHAMINE 15 MG: 30 INJECTION, SOLUTION INTRAMUSCULAR; INTRAVENOUS at 14:23

## 2023-02-26 NOTE — Clinical Note
Regan Gilma was seen and treated in our emergency department on 2/26/2023  Diagnosis:     Melissa Contreras  may return to work on return date  She may return on this date: 02/27/2023         If you have any questions or concerns, please don't hesitate to call        Neyda Alberto MD    ______________________________           _______________          _______________  Hospital Representative                              Date                                Time

## 2023-02-26 NOTE — ED PROVIDER NOTES
History  Chief Complaint   Patient presents with   • Abdominal Cramping     Lower abdominal cramping that starts 2-3 days prior to start of period  Took ibuprofen around 12 today  29-year-old female with history of dysmenorrhea presenting to the emerged department with painful  This time as well  Has taken ibuprofen without relief  No nausea vomit diarrhea pain no chest pain or shortness breath  No fever chills  Similar to prior dysmenorrhea episodes  History provided by:  Patient      Prior to Admission Medications   Prescriptions Last Dose Informant Patient Reported? Taking?   benzoyl peroxide 5 % gel   Yes No   ibuprofen (MOTRIN) 600 mg tablet   No No   Sig: Take 1 tablet (600 mg total) by mouth every 6 (six) hours as needed for mild pain   naproxen (Naprosyn) 500 mg tablet   No No   Sig: Take 1 tablet (500 mg total) by mouth 2 (two) times a day with meals   norethindrone-ethinyl estradiol-iron (Loestrin Fe 1 5/30) 1 5-30 MG-MCG tablet   No No   Sig: Take 1 tablet by mouth daily   ondansetron (ZOFRAN) 4 mg tablet   No No   Sig: Take 1 tablet (4 mg total) by mouth every 6 (six) hours   Patient not taking: Reported on 11/23/2022   ondansetron (ZOFRAN) 4 mg tablet   No No   Sig: Take 1 tablet (4 mg total) by mouth every 6 (six) hours   Patient not taking: Reported on 1/6/2023   ondansetron (Zofran ODT) 4 mg disintegrating tablet   No No   Sig: Take 1 tablet (4 mg total) by mouth every 6 (six) hours as needed for nausea or vomiting   ondansetron (Zofran) 4 mg tablet   No No   Sig: Take 1 tablet (4 mg total) by mouth every 8 (eight) hours as needed for nausea or vomiting for up to 14 days   pseudoephedrine (SUDAFED) 120 MG 12 hr tablet   No No   Sig: Take 1 tablet (120 mg total) by mouth 2 (two) times a day for 10 days      Facility-Administered Medications: None       Past Medical History:   Diagnosis Date   • Anemia    • Seasonal allergic reaction        History reviewed   No pertinent surgical history  History reviewed  No pertinent family history  I have reviewed and agree with the history as documented  E-Cigarette/Vaping   • E-Cigarette Use Never User      E-Cigarette/Vaping Substances   • Nicotine No    • THC No    • CBD No    • Flavoring No    • Other No    • Unknown No      Social History     Tobacco Use   • Smoking status: Never   • Smokeless tobacco: Never   Vaping Use   • Vaping Use: Never used   Substance Use Topics   • Alcohol use: Never   • Drug use: Yes     Types: Marijuana       Review of Systems   Constitutional: Negative for chills and fever  HENT: Negative for ear pain and sore throat  Eyes: Negative for pain and visual disturbance  Respiratory: Negative for cough and shortness of breath  Cardiovascular: Negative for chest pain and palpitations  Gastrointestinal: Negative for abdominal pain and vomiting  Genitourinary: Positive for pelvic pain  Negative for dysuria and hematuria  Musculoskeletal: Negative for arthralgias and back pain  Skin: Negative for color change and rash  Neurological: Negative for seizures and syncope  All other systems reviewed and are negative        Physical Exam  Physical Exam    Vital Signs  ED Triage Vitals   Temperature Pulse Respirations Blood Pressure SpO2   02/26/23 1416 02/26/23 1416 02/26/23 1416 02/26/23 1416 02/26/23 1416   98 8 °F (37 1 °C) 77 18 146/93 100 %      Temp Source Heart Rate Source Patient Position - Orthostatic VS BP Location FiO2 (%)   02/26/23 1416 02/26/23 1416 02/26/23 1416 02/26/23 1416 --   Oral Monitor Sitting Left arm       Pain Score       02/26/23 1423       8           Vitals:    02/26/23 1416   BP: 146/93   Pulse: 77   Patient Position - Orthostatic VS: Sitting         Visual Acuity      ED Medications  Medications   ketorolac (TORADOL) injection 15 mg (15 mg Intramuscular Given 2/26/23 1423)       Diagnostic Studies  Results Reviewed     Procedure Component Value Units Date/Time    Urine Microscopic [[id number]]  (Abnormal) Collected: 02/26/23 1422    Lab Status: Final result Specimen: Urine, Clean Catch Updated: 02/26/23 1439     RBC, UA 10-20 /hpf      WBC, UA 2-4 /hpf      Epithelial Cells Occasional /hpf      Bacteria, UA Occasional /hpf      MUCUS THREADS Occasional    UA (URINE) with reflex to Scope [[id number]]  (Abnormal) Collected: 02/26/23 1422    Lab Status: Final result Specimen: Urine, Clean Catch Updated: 02/26/23 1431     Color, UA Straw     Clarity, UA Clear     Specific Gravity, UA 1 015     pH, UA 7 0     Leukocytes, UA 25 0     Nitrite, UA Negative     Protein, UA Negative mg/dl      Glucose, UA Negative mg/dl      Ketones, UA Negative mg/dl      Bilirubin, UA Negative     Occult Blood,  0     UROBILINOGEN UA Negative mg/dL     POCT pregnancy, urine [[id number]]  (Normal) Resulted: 02/26/23 1423    Lab Status: Final result Updated: 02/26/23 1423     EXT Preg Test, Ur Negative     Control Valid                 No orders to display              Procedures  Procedures         ED Course                                             Medical Decision Making  82-year-old female presenting emerged department with dysmenorrhea  Patient had ultrasound 1 month ago that showed adenomyosis, likely cause of her dysmenorrhea  We will start patient on OCPs for adenomyosis related dysmenorrhea  Naproxen as needed for pain  Abdominal cramping: acute illness or injury  Dysmenorrhea: acute illness or injury  Amount and/or Complexity of Data Reviewed  Labs: ordered  Risk  Prescription drug management            Disposition  Final diagnoses:   Abdominal cramping   Dysmenorrhea     Time reflects when diagnosis was documented in both MDM as applicable and the Disposition within this note     Time User Action Codes Description Comment    2/26/2023  3:13 PM Gwenette Jarrod Add [R10 9] Abdominal cramping     2/26/2023  3:15 PM Gwenette Jarrod Add [N94 6] Dysmenorrhea       ED Disposition     ED Disposition   Discharge    Condition   Stable    Date/Time   Sun Feb 26, 2023  3:13 PM    Comment   Nuris Shamar discharge to home/self care  Follow-up Information     Follow up With Specialties Details Why [address] [place] [address],  Family Medicine   [address] [facility] [address] [place] [address]  [phone]            Discharge Medication List as of 2/26/2023  3:15 PM      START taking these medications    Details   drospirenone-ethinyl estradiol (Floyde Ronald) 3-0 03 MG per tablet Take 1 tablet by mouth daily for 28 days, Starting Sun 2/26/2023, Until Sun 3/26/2023, Normal         CONTINUE these medications which have NOT CHANGED    Details   benzoyl peroxide 5 % gel Starting Fri 10/22/2021, Historical Med      ibuprofen (MOTRIN) 600 mg tablet Take 1 tablet (600 mg total) by mouth every 6 (six) hours as needed for mild pain, Starting Mon 5/16/2022, Normal      naproxen (Naprosyn) 500 mg tablet Take 1 tablet (500 mg total) by mouth 2 (two) times a day with meals, Starting Mon 7/18/2022, Print      norethindrone-ethinyl estradiol-iron (Loestrin Fe 1 5/30) 1 5-30 MG-MCG tablet Take 1 tablet by mouth daily, Starting Fri 1/6/2023, Normal      ondansetron (Zofran ODT) 4 mg disintegrating tablet Take 1 tablet (4 mg total) by mouth every 6 (six) hours as needed for nausea or vomiting, Starting Wed 11/23/2022, Normal      !! ondansetron (ZOFRAN) 4 mg tablet Take 1 tablet (4 mg total) by mouth every 6 (six) hours, Starting Fri 2/11/2022, Normal      !! ondansetron (ZOFRAN) 4 mg tablet Take 1 tablet (4 mg total) by mouth every 6 (six) hours, Starting Tue 1/3/2023, Normal      pseudoephedrine (SUDAFED) 120 MG 12 hr tablet Take 1 tablet (120 mg total) by mouth 2 (two) times a day for 10 days, Starting Wed 11/23/2022, Until Sat 12/3/2022, Normal       !! - Potential duplicate medications found  Please discuss with provider  No discharge procedures on file      PDMP Review       Value Time User    PDMP Reviewed  Yes 1/31/2023  3:35 AM Lila Ayers MD          ED Provider  Electronically Signed by           Kza Gallardo MD  02/26/23 8703

## 2023-03-28 ENCOUNTER — HOSPITAL ENCOUNTER (EMERGENCY)
Facility: HOSPITAL | Age: 23
Discharge: HOME | End: 2023-03-28
Attending: EMERGENCY MEDICINE | Admitting: EMERGENCY MEDICINE

## 2023-03-28 ENCOUNTER — APPOINTMENT (EMERGENCY)
Dept: CT IMAGING | Facility: HOSPITAL | Age: 23
End: 2023-03-28

## 2023-03-28 VITALS
DIASTOLIC BLOOD PRESSURE: 69 MMHG | HEART RATE: 88 BPM | RESPIRATION RATE: 19 BRPM | SYSTOLIC BLOOD PRESSURE: 115 MMHG | TEMPERATURE: 98.4 F | OXYGEN SATURATION: 100 %

## 2023-03-28 DIAGNOSIS — R10.9 ABDOMINAL PAIN: Primary | ICD-10-CM

## 2023-03-28 DIAGNOSIS — R11.0 NAUSEA: ICD-10-CM

## 2023-03-28 LAB
ALBUMIN SERPL BCP-MCNC: 3.9 G/DL (ref 3.5–5)
ALP SERPL-CCNC: 60 U/L (ref 34–104)
ALT SERPL W P-5'-P-CCNC: 9 U/L (ref 7–52)
ANION GAP SERPL CALCULATED.3IONS-SCNC: 4 MMOL/L (ref 4–13)
AST SERPL W P-5'-P-CCNC: 14 U/L (ref 13–39)
BACTERIA UR QL AUTO: ABNORMAL /HPF
BASOPHILS # BLD AUTO: 0.02 THOUSANDS/ÂΜL (ref 0–0.1)
BASOPHILS NFR BLD AUTO: 0 % (ref 0–1)
BILIRUB SERPL-MCNC: 0.21 MG/DL (ref 0.2–1)
BILIRUB UR QL STRIP: NEGATIVE
BUN SERPL-MCNC: 12 MG/DL (ref 5–25)
CALCIUM SERPL-MCNC: 8.9 MG/DL (ref 8.4–10.2)
CHLORIDE SERPL-SCNC: 107 MMOL/L (ref 96–108)
CLARITY UR: CLEAR
CO2 SERPL-SCNC: 25 MMOL/L (ref 21–32)
COLOR UR: ABNORMAL
CREAT SERPL-MCNC: 0.88 MG/DL (ref 0.6–1.3)
EOSINOPHIL # BLD AUTO: 0.13 THOUSAND/ÂΜL (ref 0–0.61)
EOSINOPHIL NFR BLD AUTO: 2 % (ref 0–6)
ERYTHROCYTE [DISTWIDTH] IN BLOOD BY AUTOMATED COUNT: 16.8 % (ref 11.6–15.1)
EXT PREGNANCY TEST URINE: NEGATIVE
EXT. CONTROL: NORMAL
GFR SERPL CREATININE-BSD FRML MDRD: 94 ML/MIN/1.73SQ M
GLUCOSE SERPL-MCNC: 80 MG/DL (ref 65–140)
GLUCOSE UR STRIP-MCNC: NEGATIVE MG/DL
HCT VFR BLD AUTO: 35.8 % (ref 34.8–46.1)
HGB BLD-MCNC: 11 G/DL (ref 11.5–15.4)
HGB UR QL STRIP.AUTO: ABNORMAL
IMM GRANULOCYTES # BLD AUTO: 0.02 THOUSAND/UL (ref 0–0.2)
IMM GRANULOCYTES NFR BLD AUTO: 0 % (ref 0–2)
KETONES UR STRIP-MCNC: ABNORMAL MG/DL
LEUKOCYTE ESTERASE UR QL STRIP: ABNORMAL
LIPASE SERPL-CCNC: 8 U/L (ref 11–82)
LYMPHOCYTES # BLD AUTO: 2.57 THOUSANDS/ÂΜL (ref 0.6–4.47)
LYMPHOCYTES NFR BLD AUTO: 35 % (ref 14–44)
MCH RBC QN AUTO: 24.7 PG (ref 26.8–34.3)
MCHC RBC AUTO-ENTMCNC: 30.7 G/DL (ref 31.4–37.4)
MCV RBC AUTO: 80 FL (ref 82–98)
MONOCYTES # BLD AUTO: 0.55 THOUSAND/ÂΜL (ref 0.17–1.22)
MONOCYTES NFR BLD AUTO: 8 % (ref 4–12)
MUCOUS THREADS UR QL AUTO: ABNORMAL
NEUTROPHILS # BLD AUTO: 3.98 THOUSANDS/ÂΜL (ref 1.85–7.62)
NEUTS SEG NFR BLD AUTO: 55 % (ref 43–75)
NITRITE UR QL STRIP: NEGATIVE
NON-SQ EPI CELLS URNS QL MICRO: ABNORMAL /HPF
NRBC BLD AUTO-RTO: 0 /100 WBCS
PH UR STRIP.AUTO: 6 [PH]
PLATELET # BLD AUTO: 312 THOUSANDS/UL (ref 149–390)
PMV BLD AUTO: 11.6 FL (ref 8.9–12.7)
POTASSIUM SERPL-SCNC: 4.2 MMOL/L (ref 3.5–5.3)
PROT SERPL-MCNC: 8.1 G/DL (ref 6.4–8.4)
PROT UR STRIP-MCNC: ABNORMAL MG/DL
RBC # BLD AUTO: 4.46 MILLION/UL (ref 3.81–5.12)
RBC #/AREA URNS AUTO: ABNORMAL /HPF
SODIUM SERPL-SCNC: 136 MMOL/L (ref 135–147)
SP GR UR STRIP.AUTO: 1.03 (ref 1–1.03)
UROBILINOGEN UR STRIP-ACNC: <2 MG/DL
WBC # BLD AUTO: 7.27 THOUSAND/UL (ref 4.31–10.16)
WBC #/AREA URNS AUTO: ABNORMAL /HPF

## 2023-03-28 RX ORDER — ONDANSETRON 4 MG/1
4 TABLET, ORALLY DISINTEGRATING ORAL ONCE
Status: COMPLETED | OUTPATIENT
Start: 2023-03-28 | End: 2023-03-28

## 2023-03-28 RX ORDER — ACETAMINOPHEN 325 MG/1
975 TABLET ORAL ONCE
Status: COMPLETED | OUTPATIENT
Start: 2023-03-28 | End: 2023-03-28

## 2023-03-28 RX ORDER — KETOROLAC TROMETHAMINE 30 MG/ML
15 INJECTION, SOLUTION INTRAMUSCULAR; INTRAVENOUS ONCE
Status: COMPLETED | OUTPATIENT
Start: 2023-03-28 | End: 2023-03-28

## 2023-03-28 RX ADMIN — IOHEXOL 100 ML: 350 INJECTION, SOLUTION INTRAVENOUS at 03:29

## 2023-03-28 RX ADMIN — ACETAMINOPHEN 975 MG: 325 TABLET ORAL at 04:47

## 2023-03-28 RX ADMIN — ONDANSETRON 4 MG: 4 TABLET, ORALLY DISINTEGRATING ORAL at 00:31

## 2023-03-28 RX ADMIN — KETOROLAC TROMETHAMINE 15 MG: 30 INJECTION, SOLUTION INTRAMUSCULAR at 02:27

## 2023-03-28 NOTE — ED PROVIDER NOTES
History  Chief Complaint   Patient presents with   • Abdominal Pain     Pt states she is currently on her menstrual cycle and is having lower abdominal cramping and N/V  Pt also states she is having lower back pain  22 yo F no pertinent past medical history presents to the emergency department for left-sided abdominal pain, nausea, vomiting started today  Patient states when she gets her menstrual cycle she has this pain but also has some mild abdominal cramping prior to her menstrual cycle starting  Patient states her menstrual cycle started today and she developed sudden onset of left-sided abdominal pain  Patient also developed nausea and vomiting  Patient states she tried to take Motrin but immediately vomited  Patient denies fevers, chills, back pain, hematemesis, bowel/bladder changes, pregnancy  History provided by:  Patient      Prior to Admission Medications   Prescriptions Last Dose Informant Patient Reported?  Taking?   benzoyl peroxide 5 % gel   Yes No   drospirenone-ethinyl estradiol (HAKEEM) 3-0 03 MG per tablet   No No   Sig: Take 1 tablet by mouth daily for 28 days   ibuprofen (MOTRIN) 600 mg tablet   No No   Sig: Take 1 tablet (600 mg total) by mouth every 6 (six) hours as needed for mild pain   naproxen (Naprosyn) 500 mg tablet   No No   Sig: Take 1 tablet (500 mg total) by mouth 2 (two) times a day with meals   naproxen (Naprosyn) 500 mg tablet   No No   Sig: Take 1 tablet (500 mg total) by mouth 2 (two) times a day with meals   norethindrone-ethinyl estradiol-iron (Loestrin Fe 1 5/30) 1 5-30 MG-MCG tablet   No No   Sig: Take 1 tablet by mouth daily   ondansetron (ZOFRAN) 4 mg tablet   No No   Sig: Take 1 tablet (4 mg total) by mouth every 6 (six) hours   Patient not taking: Reported on 11/23/2022   ondansetron (ZOFRAN) 4 mg tablet   No No   Sig: Take 1 tablet (4 mg total) by mouth every 6 (six) hours   Patient not taking: Reported on 1/6/2023   ondansetron (Zofran ODT) 4 mg disintegrating tablet   No No   Sig: Take 1 tablet (4 mg total) by mouth every 6 (six) hours as needed for nausea or vomiting   ondansetron (Zofran) 4 mg tablet   No No   Sig: Take 1 tablet (4 mg total) by mouth every 8 (eight) hours as needed for nausea or vomiting for up to 14 days   pseudoephedrine (SUDAFED) 120 MG 12 hr tablet   No No   Sig: Take 1 tablet (120 mg total) by mouth 2 (two) times a day for 10 days      Facility-Administered Medications: None       Past Medical History:   Diagnosis Date   • Anemia    • Seasonal allergic reaction        History reviewed  No pertinent surgical history  History reviewed  No pertinent family history  I have reviewed and agree with the history as documented  E-Cigarette/Vaping   • E-Cigarette Use Never User      E-Cigarette/Vaping Substances   • Nicotine No    • THC No    • CBD No    • Flavoring No    • Other No    • Unknown No      Social History     Tobacco Use   • Smoking status: Never   • Smokeless tobacco: Never   Vaping Use   • Vaping Use: Never used   Substance Use Topics   • Alcohol use: Never   • Drug use: Not Currently     Types: Marijuana        Review of Systems   Constitutional: Negative for chills and fever  HENT: Negative for ear pain and sore throat  Eyes: Negative for pain and visual disturbance  Respiratory: Negative for cough and shortness of breath  Cardiovascular: Negative for chest pain and palpitations  Gastrointestinal: Positive for abdominal pain, nausea and vomiting  Negative for constipation and diarrhea  Genitourinary: Positive for vaginal bleeding (menstrual cycle)  Negative for dysuria and hematuria  Musculoskeletal: Negative for arthralgias and back pain  Skin: Negative for color change and rash  Neurological: Negative for seizures and syncope  All other systems reviewed and are negative        Physical Exam  ED Triage Vitals [03/28/23 0027]   Temperature Pulse Respirations Blood Pressure SpO2   98 4 °F (36 9 °C) 89 20 119/76 99 %      Temp Source Heart Rate Source Patient Position - Orthostatic VS BP Location FiO2 (%)   Oral Monitor Lying Right arm --      Pain Score       8             Orthostatic Vital Signs  Vitals:    03/28/23 0027 03/28/23 0401   BP: 119/76 115/69   Pulse: 89 88   Patient Position - Orthostatic VS: Lying        Physical Exam  Vitals and nursing note reviewed  Constitutional:       General: She is not in acute distress  Appearance: She is well-developed  HENT:      Head: Normocephalic and atraumatic  Eyes:      Conjunctiva/sclera: Conjunctivae normal    Cardiovascular:      Rate and Rhythm: Normal rate and regular rhythm  Heart sounds: No murmur heard  Pulmonary:      Effort: Pulmonary effort is normal  No respiratory distress  Breath sounds: Normal breath sounds  Abdominal:      General: Bowel sounds are normal       Palpations: Abdomen is soft  Tenderness: There is abdominal tenderness in the left upper quadrant and left lower quadrant  There is no right CVA tenderness or left CVA tenderness  Musculoskeletal:         General: No swelling  Cervical back: Neck supple  Skin:     General: Skin is warm and dry  Capillary Refill: Capillary refill takes less than 2 seconds  Neurological:      Mental Status: She is alert     Psychiatric:         Mood and Affect: Mood normal          ED Medications  Medications   ondansetron (ZOFRAN-ODT) dispersible tablet 4 mg (4 mg Oral Given 3/28/23 0031)   ketorolac (TORADOL) injection 15 mg (15 mg Intravenous Given 3/28/23 0227)   iohexol (OMNIPAQUE) 350 MG/ML injection (SINGLE-DOSE) 100 mL (100 mL Intravenous Given 3/28/23 0329)   acetaminophen (TYLENOL) tablet 975 mg (975 mg Oral Given 3/28/23 0447)       Diagnostic Studies  Results Reviewed     Procedure Component Value Units Date/Time    Lipase [[id number]]  (Abnormal) Collected: 03/28/23 0225    Lab Status: Final result Specimen: Blood from Arm, Left Updated: 03/28/23 2625 Lipase 8 u/L     Comprehensive metabolic panel [[id number]] Collected: 03/28/23 0225    Lab Status: Final result Specimen: Blood from Arm, Left Updated: 03/28/23 0309     Sodium 136 mmol/L      Potassium 4 2 mmol/L      Chloride 107 mmol/L      CO2 25 mmol/L      ANION GAP 4 mmol/L      BUN 12 mg/dL      Creatinine 0 88 mg/dL      Glucose 80 mg/dL      Calcium 8 9 mg/dL      AST 14 U/L      ALT 9 U/L      Alkaline Phosphatase 60 U/L      Total Protein 8 1 g/dL      Albumin 3 9 g/dL      Total Bilirubin 0 21 mg/dL      eGFR 94 ml/min/1 73sq m     Narrative:      [place] guidelines for Chronic Kidney Disease (CKD):   •  Stage 1 with normal or high GFR (GFR > 90 mL/min/1 73 square meters)  •  Stage 2 Mild CKD (GFR = 60-89 mL/min/1 73 square meters)  •  Stage 3A Moderate CKD (GFR = 45-59 mL/min/1 73 square meters)  •  Stage 3B Moderate CKD (GFR = 30-44 mL/min/1 73 square meters)  •  Stage 4 Severe CKD (GFR = 15-29 mL/min/1 73 square meters)  •  Stage 5 End Stage CKD (GFR <15 mL/min/1 73 square meters)  Note: GFR calculation is accurate only with a steady state creatinine    Urine culture [[id number]] Collected: 03/28/23 0205    Lab Status:  In process Specimen: Urine, Other Updated: 03/28/23 0302    Urine Microscopic [[id number]]  (Abnormal) Collected: 03/28/23 0205    Lab Status: Final result Specimen: Urine, Clean Catch Updated: 03/28/23 0255     RBC, UA Innumerable /hpf      WBC, UA 4-10 /hpf      Epithelial Cells Occasional /hpf      Bacteria, UA None Seen /hpf      MUCUS THREADS Occasional    CBC and differential [[id number]]  (Abnormal) Collected: 03/28/23 0225    Lab Status: Final result Specimen: Blood from Arm, Left Updated: 03/28/23 0235     WBC 7 27 Thousand/uL      RBC 4 46 Million/uL      Hemoglobin 11 0 g/dL      Hematocrit 35 8 %      MCV 80 fL      MCH 24 7 pg      MCHC 30 7 g/dL      RDW 16 8 %      MPV 11 6 fL      Platelets 680 Thousands/uL      nRBC 0 /100 WBCs      Neutrophils Relative 55 %      Immat GRANS % 0 %      Lymphocytes Relative 35 %      Monocytes Relative 8 %      Eosinophils Relative 2 %      Basophils Relative 0 %      Neutrophils Absolute 3 98 Thousands/µL      Immature Grans Absolute 0 02 Thousand/uL      Lymphocytes Absolute 2 57 Thousands/µL      Monocytes Absolute 0 55 Thousand/µL      Eosinophils Absolute 0 13 Thousand/µL      Basophils Absolute 0 02 Thousands/µL     UA (URINE) with reflex to Scope [[id number]]  (Abnormal) Collected: 03/28/23 0205    Lab Status: Final result Specimen: Urine, Clean Catch Updated: 03/28/23 0227     Color, UA Light Yellow     Clarity, UA Clear     Specific Gans, UA 1 030     pH, UA 6 0     Leukocytes, UA Small     Nitrite, UA Negative     Protein, UA 30 (1+) mg/dl      Glucose, UA Negative mg/dl      Ketones, UA Trace mg/dl      Urobilinogen, UA <2 0 mg/dl      Bilirubin, UA Negative     Occult Blood, UA Large    POCT pregnancy, urine [[id number]]  (Normal) Resulted: 03/28/23 0212    Lab Status: Final result Updated: 03/28/23 0213     EXT Preg Test, Ur Negative     Control Valid                 CT abdomen pelvis with contrast   Final Result by Mikey Edouard MD (03/28 0355)      Mild diffuse thickening of the left colon may be due to underdistention versus colitis  Workstation performed: [id number]               Procedures  Procedures      ED Course  ED Course as of 03/28/23 0700   Tue Mar 28, 2023   0150 Blood Pressure: 119/76   0150 Temperature: 98 4 °F (36 9 °C)  Oral temp   0150 Pulse: 89   0150 Respirations: 20   0150 SpO2: 99 %   0150 22 yo F no pertinent past medical history presents to the emergency department for left-sided abdominal pain, nausea, vomiting started today  Patient states when she gets her menstrual cycle she has this pain but also has some mild abdominal cramping prior to her menstrual cycle starting    Patient states her menstrual cycle started today and she developed sudden onset of left-sided abdominal pain   Patient also developed nausea and vomiting  Patient states she tried to take Motrin but immediately vomited  Patient denies fevers, chills, back pain, hematemesis, bowel/bladder changes, pregnancy  Physical exam: Heart is regular rate and rhythm, lungs were clear to auscultation bilaterally, abdomen was soft, bowel sounds were present, tenderness to left upper and left lower quadrant, most severe left lower quadrant  Concern for: Menstrual cycle pain versus ovarian cyst versus constipation versus   0218 PREGNANCY TEST URINE: Negative   0242 UA (URINE) with reflex to Scope(!)  Small leukocytes  Negative nitrites  +Protein  Trace ketones  Large blood  0242 CBC and differential(!)  Hgb 11 0, at baseline  Low MCV + elevated RDW, consistent with iron deficiency amenia  0258 RBC, UA(!): Innumerable   0258 WBC, UA(!): 4-10   0258 Bacteria, UA: None Seen  Unlikely UTI but will order urine culture   0258 MUCUS THREADS(!): Occasional   0318 Lipase(!): 8   0322 Comprehensive metabolic panel  WNL   6241 CT abdomen pelvis with contrast  Mild diffuse thickening of the left colon may be due to underdistention versus colitis  [id number] Results discussed with patient  She expressed understanding  Plan: Discharge patient home with instructions to follow-up with primary care doctor  Patient expressed understanding was agreeable with plan  Instructed patient to return to the emergency department for any new or worsening symptoms  Patient expressed understanding  Patient given the opportunity ask questions the emergency department  All questions and concerns were addressed the emergency department  Medical Decision Making  See ED course for more details of medical decision making    Amount and/or Complexity of Data Reviewed  Labs: ordered  Decision-making details documented in ED Course  Radiology: ordered  Decision-making details documented in ED Course        Risk  OTC drugs   Prescription drug management  Disposition  Final diagnoses:   Abdominal pain   Nausea     Time reflects when diagnosis was documented in both MDM as applicable and the Disposition within this note     Time User Action Codes Description Comment    3/28/2023  4:08 AM Jayy FRANCIS Add [R10 9] Abdominal pain     3/28/2023  4:08 AM Donelle Boxer Add [R11 0] Nausea       ED Disposition     ED Disposition   Discharge    Condition   Stable    Date/Time   Tue Mar 28, 2023  4:08 AM    Comment   Daquan Nuñez discharge to home/self care  Follow-up Information     Follow up With Specialties Details Why Contact Info Additional Dwight Quinn, DO Family Medicine Call today Schedule follow-up appointment   [address]  [address],[facility] Emergency Medicine Go to  As needed, If symptoms worsen [address] [facility] [id number] [place] [address] Emergency Department, [address], [place], [place], [address]          Discharge Medication List as of 3/28/2023  4:11 AM      CONTINUE these medications which have NOT CHANGED    Details   benzoyl peroxide 5 % gel Starting Fri 10/22/2021, Historical Med      drospirenone-ethinyl estradiol (Vitor Sky) 3-0 03 MG per tablet Take 1 tablet by mouth daily for 28 days, Starting Sun 2/26/2023, Until Sun 3/26/2023, Normal      ibuprofen (MOTRIN) 600 mg tablet Take 1 tablet (600 mg total) by mouth every 6 (six) hours as needed for mild pain, Starting Mon 5/16/2022, Normal      !! naproxen (Naprosyn) 500 mg tablet Take 1 tablet (500 mg total) by mouth 2 (two) times a day with meals, Starting Mon 7/18/2022, Print      !! naproxen (Naprosyn) 500 mg tablet Take 1 tablet (500 mg total) by mouth 2 (two) times a day with meals, Starting Sun 2/26/2023, Normal      norethindrone-ethinyl estradiol-iron (Loestrin Fe 1 5/30) 1 5-30 MG-MCG tablet Take 1 tablet by mouth daily, Starting Fri 1/6/2023, Normal      ondansetron (Zofran ODT) 4 mg disintegrating tablet Take 1 tablet (4 mg total) by mouth every 6 (six) hours as needed for nausea or vomiting, Starting Wed 11/23/2022, Normal      !! ondansetron (ZOFRAN) 4 mg tablet Take 1 tablet (4 mg total) by mouth every 6 (six) hours, Starting Fri 2/11/2022, Normal      !! ondansetron (ZOFRAN) 4 mg tablet Take 1 tablet (4 mg total) by mouth every 6 (six) hours, Starting Tue 1/3/2023, Normal      pseudoephedrine (SUDAFED) 120 MG 12 hr tablet Take 1 tablet (120 mg total) by mouth 2 (two) times a day for 10 days, Starting Wed 11/23/2022, Until Sat 12/3/2022, Normal       !! - Potential duplicate medications found  Please discuss with provider  No discharge procedures on file  PDMP Review       Value Time User    PDMP Reviewed  Yes 1/31/2023  3:35 AM Clifford Aranda MD           ED Provider  Attending physically available and evaluated Phoenix Martini  DOROTEO managed the patient along with the ED Attending      Electronically Signed by         Alma Sky DO  03/28/23 0700

## 2023-03-28 NOTE — DISCHARGE INSTRUCTIONS
You were seen and evaluated in the emergency department for abdominal pain and nausea  Your lab work was unremarkable, urinalysis was sent for culture, they will contact you if it is positive  CT scan showed thickening of your colon on the left side, could be consistent with inflammation of the colon or no stool in your colon  Please contact your primary care doctor to schedule follow-up appointment after being evaluated the emergency department  Please return to the emergency department for any new or worsening symptoms, including but not limited to: Fever, worsening pain, nausea and vomiting not controlled with medicine at home, blood in stool, shortness of breath, or any new or worsening symptoms

## 2023-03-29 LAB — BACTERIA UR CULT: NORMAL

## 2023-03-30 NOTE — ED ATTENDING ATTESTATION
3/27/2023  IDominik MD, saw and evaluated the patient  I have discussed the patient with the resident/non-physician practitioner and agree with the resident's/non-physician practitioner's findings, Plan of Care, and MDM as documented in the resident's/non-physician practitioner's note, except where noted  All available labs and Radiology studies were reviewed  I was present for key portions of any procedure(s) performed by the resident/non-physician practitioner and I was immediately available to provide assistance  At this point I agree with the current assessment done in the Emergency Department  I have conducted an independent evaluation of this patient a history and physical is as follows:    73-year-old presenting with left-sided abdominal pain, nausea and vomiting  No chest pain or trouble breathing  No fevers or chills  No diarrhea  Lower back pain      Lower abdominal tenderness on exam   Agree with checking abdominal labs, urine, CT  differential includes ovarian cyst, torsion, colitis, appendicitis, diverticulitis, renal colic, UTI    ED Course         Critical Care Time  Procedures

## 2023-04-20 ENCOUNTER — APPOINTMENT (OUTPATIENT)
Dept: URGENT CARE | Age: 23
End: 2023-04-20

## 2023-05-17 ENCOUNTER — HOSPITAL ENCOUNTER (EMERGENCY)
Facility: HOSPITAL | Age: 23
Discharge: HOME | End: 2023-05-17
Attending: EMERGENCY MEDICINE

## 2023-05-17 VITALS
WEIGHT: 240.9 LBS | OXYGEN SATURATION: 99 % | TEMPERATURE: 99.3 F | RESPIRATION RATE: 16 BRPM | BODY MASS INDEX: 41.35 KG/M2 | DIASTOLIC BLOOD PRESSURE: 90 MMHG | HEART RATE: 63 BPM | SYSTOLIC BLOOD PRESSURE: 162 MMHG

## 2023-05-17 DIAGNOSIS — N94.6 DYSMENORRHEA: Primary | ICD-10-CM

## 2023-05-17 DIAGNOSIS — R11.0 NAUSEA: ICD-10-CM

## 2023-05-17 LAB
EXT PREGNANCY TEST URINE: NEGATIVE
EXT. CONTROL: NORMAL

## 2023-05-17 RX ORDER — ONDANSETRON 4 MG/1
4 TABLET, ORALLY DISINTEGRATING ORAL EVERY 8 HOURS PRN
Qty: 20 TABLET | Refills: 0 | Status: SHIPPED | OUTPATIENT
Start: 2023-05-17 | End: 2023-05-27

## 2023-05-17 RX ORDER — NAPROXEN 500 MG/1
500 TABLET ORAL 2 TIMES DAILY WITH MEALS
Qty: 20 TABLET | Refills: 0 | Status: SHIPPED | OUTPATIENT
Start: 2023-05-17 | End: 2024-05-16

## 2023-05-17 RX ORDER — KETOROLAC TROMETHAMINE 30 MG/ML
15 INJECTION, SOLUTION INTRAMUSCULAR; INTRAVENOUS ONCE
Status: COMPLETED | OUTPATIENT
Start: 2023-05-17 | End: 2023-05-17

## 2023-05-17 RX ORDER — ONDANSETRON 4 MG/1
4 TABLET, ORALLY DISINTEGRATING ORAL ONCE
Status: COMPLETED | OUTPATIENT
Start: 2023-05-17 | End: 2023-05-17

## 2023-05-17 RX ADMIN — ONDANSETRON 4 MG: 4 TABLET, ORALLY DISINTEGRATING ORAL at 13:19

## 2023-05-17 RX ADMIN — KETOROLAC TROMETHAMINE 15 MG: 30 INJECTION, SOLUTION INTRAMUSCULAR; INTRAVENOUS at 13:19

## 2023-05-17 NOTE — Clinical Note
Jose Luis Ramos was seen and treated in our emergency department on 5/17/2023  Diagnosis:     Kobe Hair  may return to work on return date  She may return on this date: 05/18/2023         If you have any questions or concerns, please don't hesitate to call        Chantel Jewell PA-C    ______________________________           _______________          _______________  Hospital Representative                              Date                                Time

## 2023-05-17 NOTE — ED PROVIDER NOTES
"History  Chief Complaint   Patient presents with   • Abdominal Cramping     Menses today  Cramping the worse since last night  She already sees OB/GYN  • Nausea     27-year-old female with past medical history significant for menstrual cramping and anemia reports for the last month and really over the past year each month she has been having severe menstrual cramping  Patient had a pelvic ultrasound and CT imaging in the past has been using oral birth control  And reports \"I have tried every form of oral birth control\"  Patient reports she has been taking ibuprofen as needed for pain  Patient reports she experiences nausea associated with her menstrual cramping  Patient reports the bleeding is typical of her second day of her menstrual cycle however this is the first day  Patient specifically denies any fevers, chills, chest pain, shortness of breath, palpitations, syncope  Prior to Admission Medications   Prescriptions Last Dose Informant Patient Reported? Taking?   drospirenone-ethinyl estradiol (HAKEEM) 3-0 03 MG per tablet   No No   Sig: Take 1 tablet by mouth daily for 28 days   norethindrone-ethinyl estradiol-iron (Loestrin Fe 1 5/30) 1 5-30 MG-MCG tablet Not Taking  No No   Sig: Take 1 tablet by mouth daily   Patient not taking: Reported on 5/17/2023      Facility-Administered Medications: None       Past Medical History:   Diagnosis Date   • Anemia    • Seasonal allergic reaction        History reviewed  No pertinent surgical history  History reviewed  No pertinent family history  I have reviewed and agree with the history as documented      E-Cigarette/Vaping   • E-Cigarette Use Never User      E-Cigarette/Vaping Substances   • Nicotine No    • THC No    • CBD No    • Flavoring No    • Other No    • Unknown No      Social History     Tobacco Use   • Smoking status: Never   • Smokeless tobacco: Never   Vaping Use   • Vaping Use: Never used   Substance Use Topics   • Alcohol use: Never " • Drug use: Not Currently     Types: Marijuana       Review of Systems   Constitutional: Negative for chills, fatigue and fever  HENT: Negative for congestion, ear pain, rhinorrhea and sore throat  Eyes: Negative for redness  Respiratory: Negative for chest tightness and shortness of breath  Cardiovascular: Negative for chest pain and palpitations  Gastrointestinal: Positive for abdominal pain and nausea  Negative for vomiting  Genitourinary: Positive for menstrual problem  Negative for dysuria and hematuria  Musculoskeletal: Negative  Skin: Negative for rash  Neurological: Negative for dizziness, syncope, light-headedness and numbness  Physical Exam  Physical Exam  Vitals and nursing note reviewed  Constitutional:       Appearance: She is well-developed  HENT:      Head: Normocephalic  Eyes:      General: No scleral icterus  Cardiovascular:      Rate and Rhythm: Normal rate and regular rhythm  Pulmonary:      Effort: Pulmonary effort is normal       Breath sounds: Normal breath sounds  No stridor  Abdominal:      General: There is no distension  Palpations: Abdomen is soft  Tenderness: There is abdominal tenderness  Comments: Mild suprapubic     Musculoskeletal:         General: Normal range of motion  Skin:     General: Skin is warm and dry  Capillary Refill: Capillary refill takes less than 2 seconds  Neurological:      Mental Status: She is alert and oriented to person, place, and time           Vital Signs  ED Triage Vitals [05/17/23 1238]   Temperature Pulse Respirations Blood Pressure SpO2   99 3 °F (37 4 °C) 63 16 162/90 99 %      Temp src Heart Rate Source Patient Position - Orthostatic VS BP Location FiO2 (%)   -- -- -- -- --      Pain Score       8           Vitals:    05/17/23 1238   BP: 162/90   Pulse: 63         Visual Acuity      ED Medications  Medications   ketorolac (TORADOL) injection 15 mg (15 mg Intramuscular Given 5/17/23 1319) "  ondansetron (ZOFRAN-ODT) dispersible tablet 4 mg (4 mg Oral Given 5/17/23 1319)       Diagnostic Studies  Results Reviewed     Procedure Component Value Units Date/Time    POCT pregnancy, urine [[id number]]  (Normal) Resulted: 05/17/23 1308    Lab Status: Final result Updated: 05/17/23 1311     EXT Preg Test, Ur Negative     Control Valid                 No orders to display              Procedures  Procedures         ED Course         CRAFFT    Flowsheet Row Most Recent Value   CRAFFT Initial Screen: During the past 12 months, did you:    1  Drink any alcohol (more than a few sips)? No Filed at: 05/17/2023 1258   2  Smoke any marijuana or hashish No Filed at: 05/17/2023 1258   3  Use anything else to get high? (\"anything else\" includes illegal drugs, over the counter and prescription drugs, and things that you sniff or 'khoury')? No Filed at: 05/17/2023 1258   CRAFFT Full Screen: During the past 12 months:    1  Have you ever ridden in a car driven by someone (including yourself) who was \"high\" or had been using alcohol or drugs? 0 Filed at: 05/17/2023 1258   2  Do you ever use alcohol or drugs to relax, feel better about yourself, or fit in? 0 Filed at: 05/17/2023 1258   3  Do you ever use alcohol/drugs while you are by yourself, alone? 0 Filed at: 05/17/2023 1258   4  Do you ever forget things you did while using alcohol or drugs? 0 Filed at: 05/17/2023 1258   5  Do your family or friends ever tell you that you should cut down on your drinking or drug use? 0 Filed at: 05/17/2023 1258   6  Have you gotten into trouble while you were using alcohol or drugs? 0 Filed at: 05/17/2023 1258   CRAFFT Score 0 Filed at: 05/17/2023 1258                                          Medical Decision Making  22 y/o female, presenting for dysmenorrhea has had multiple visits for the same complaints and has had multiple work-ups and multiple imaging studies and radiography performed    In the setting of normal vital signs with no " "tachycardia and no reported acute red flag symptoms for acute blood loss anemia and a abdominal exam without peritoneal irritation and a negative pregnancy test patient will be referred to follow-up with OB/GYN  Toradol, Zofran administered in this emergency department naproxen and Zofran prescribed for use at home and the patient was referred to a high utilizer plan and advised of this referral so as to aid in future therapies and ensure appropriate outpatient resource management  All imaging and/or lab testing discussed with patient, strict return to ED precautions discussed  Patient recommended to follow up promptly with appropriate outpatient provider  Patient and/or family members verbalizes understanding and agrees with plan  Patient is stable for discharge      Portions of the record may have been created with voice recognition software  Occasional wrong word or \"sound a like\" substitutions may have occurred due to the inherent limitations of voice recognition software  Read the chart carefully and recognize, using context, where substitutions have occurred  Disposition  Final diagnoses:   Dysmenorrhea   Nausea     Time reflects when diagnosis was documented in both MDM as applicable and the Disposition within this note     Time User Action Codes Description Comment    5/17/2023  1:13 PM Althea Ramos Clari [N94 6] Dysmenorrhea     5/17/2023  1:13 PM Nojohanna Srinivasan Add [R11 0] Nausea       ED Disposition     ED Disposition   Discharge    Condition   Good    Date/Time   Wed May 17, 2023  1:12 PM    Comment   Alvin Memos discharge to home/self care                 Follow-up Information     Follow up With Specialties Details Why Jennifer Cortes  Obstetrics and Gynecology Schedule an appointment as soon as possible for a visit   [address] Ob/Gyn Obstetrics and Gynecology Schedule an appointment as soon as " possible for a visit in 2 days As needed [facility]            Discharge Medication List as of 5/17/2023  1:43 PM      START taking these medications    Details   naproxen (EC NAPROSYN) 500 MG EC tablet Take 1 tablet (500 mg total) by mouth 2 (two) times a day with meals, Starting Wed 5/17/2023, Until Thu 5/16/2024, Normal      ondansetron (ZOFRAN-ODT) 4 mg disintegrating tablet Take 1 tablet (4 mg total) by mouth every 8 (eight) hours as needed for nausea for up to 10 days, Starting Wed 5/17/2023, Until Sat 5/27/2023 at 2359, Normal         CONTINUE these medications which have NOT CHANGED    Details   drospirenone-ethinyl estradiol (HAKEEM) 3-0 03 MG per tablet Take 1 tablet by mouth daily for 28 days, Starting Sun 2/26/2023, Until Sun 3/26/2023, Normal      norethindrone-ethinyl estradiol-iron (Loestrin Fe 1 5/30) 1 5-30 MG-MCG tablet Take 1 tablet by mouth daily, Starting Fri 1/6/2023, Normal             No discharge procedures on file      PDMP Review       Value Time User    PDMP Reviewed  Yes 1/31/2023  3:35 AM Elisabet Payan MD          ED Provider  Electronically Signed by           Sam Aguilar PA-C  05/17/23 1930

## 2023-05-23 NOTE — PROGRESS NOTES
Patient reviewed by [address] [place] [address] Bag Borrow or Steal 5/20/23 and it was determined care plan is not appropriate at this time  Recommendation from committee is for patient re-establish with OB/GYN  [facility] complex care management as a Rising Utilizer

## 2023-06-16 ENCOUNTER — APPOINTMENT (EMERGENCY)
Dept: RADIOLOGY | Facility: HOSPITAL | Age: 23
End: 2023-06-16
Payer: COMMERCIAL

## 2023-06-16 ENCOUNTER — HOSPITAL ENCOUNTER (EMERGENCY)
Facility: HOSPITAL | Age: 23
Discharge: HOME | End: 2023-06-16
Attending: EMERGENCY MEDICINE
Payer: COMMERCIAL

## 2023-06-16 VITALS
TEMPERATURE: 98.3 F | RESPIRATION RATE: 16 BRPM | OXYGEN SATURATION: 100 % | HEART RATE: 68 BPM | DIASTOLIC BLOOD PRESSURE: 80 MMHG | SYSTOLIC BLOOD PRESSURE: 143 MMHG

## 2023-06-16 DIAGNOSIS — R07.89 CHEST WALL PAIN: Primary | ICD-10-CM

## 2023-06-16 DIAGNOSIS — N94.6 MENSTRUAL PAIN: ICD-10-CM

## 2023-06-16 DIAGNOSIS — R11.0 NAUSEA: ICD-10-CM

## 2023-06-16 LAB
ATRIAL RATE: 63 BPM
P AXIS: 75 DEGREES
PR INTERVAL: 92 MS
QRS AXIS: 83 DEGREES
QRSD INTERVAL: 86 MS
QT INTERVAL: 384 MS
QTC INTERVAL: 392 MS
T WAVE AXIS: 8 DEGREES
VENTRICULAR RATE: 63 BPM

## 2023-06-16 PROCEDURE — 71046 X-RAY EXAM CHEST 2 VIEWS: CPT

## 2023-06-16 PROCEDURE — 93010 ELECTROCARDIOGRAM REPORT: CPT | Performed by: INTERNAL MEDICINE

## 2023-06-16 PROCEDURE — 96374 THER/PROPH/DIAG INJ IV PUSH: CPT

## 2023-06-16 PROCEDURE — 99284 EMERGENCY DEPT VISIT MOD MDM: CPT

## 2023-06-16 PROCEDURE — 94640 AIRWAY INHALATION TREATMENT: CPT

## 2023-06-16 PROCEDURE — 93005 ELECTROCARDIOGRAM TRACING: CPT

## 2023-06-16 PROCEDURE — 96375 TX/PRO/DX INJ NEW DRUG ADDON: CPT

## 2023-06-16 RX ORDER — KETOROLAC TROMETHAMINE 30 MG/ML
15 INJECTION, SOLUTION INTRAMUSCULAR; INTRAVENOUS ONCE
Status: COMPLETED | OUTPATIENT
Start: 2023-06-16 | End: 2023-06-16

## 2023-06-16 RX ORDER — NAPROXEN 500 MG/1
500 TABLET ORAL 2 TIMES DAILY WITH MEALS
Qty: 30 TABLET | Refills: 0 | Status: SHIPPED | OUTPATIENT
Start: 2023-06-16

## 2023-06-16 RX ORDER — ONDANSETRON 2 MG/ML
4 INJECTION INTRAMUSCULAR; INTRAVENOUS ONCE
Status: COMPLETED | OUTPATIENT
Start: 2023-06-16 | End: 2023-06-16

## 2023-06-16 RX ORDER — ALBUTEROL SULFATE 2.5 MG/3ML
2.5 SOLUTION RESPIRATORY (INHALATION) ONCE
Status: COMPLETED | OUTPATIENT
Start: 2023-06-16 | End: 2023-06-16

## 2023-06-16 RX ORDER — ONDANSETRON 4 MG/1
4 TABLET, ORALLY DISINTEGRATING ORAL EVERY 6 HOURS PRN
Qty: 20 TABLET | Refills: 0 | Status: SHIPPED | OUTPATIENT
Start: 2023-06-16

## 2023-06-16 RX ADMIN — ALBUTEROL SULFATE 2.5 MG: 2.5 SOLUTION RESPIRATORY (INHALATION) at 10:07

## 2023-06-16 RX ADMIN — ONDANSETRON 4 MG: 2 INJECTION INTRAMUSCULAR; INTRAVENOUS at 10:08

## 2023-06-16 RX ADMIN — KETOROLAC TROMETHAMINE 15 MG: 30 INJECTION, SOLUTION INTRAMUSCULAR; INTRAVENOUS at 10:07

## 2023-06-16 NOTE — ED PROVIDER NOTES
History  Chief Complaint   Patient presents with   • Shortness of Breath     Reports an acute onset of CP and SOB since 7am  No hx of asthma     22 YO female presents with chest pain and shortness of breath  States this has been present for the last day  She states currently has her period, she has had pelvic pain, nausea and frequent vomiting, states her chest pain has worsened due to her frequent vomiting  Pain is diffuse over the anterior chest, worse with deep breathing and movement  She has had difficultly keeping down PO due to nausea  Patient had been following with her OB/GYN for maintenance of her periods, taking birth control pills; she states she was getting menses every 2 weeks on these medications so she stopped taking them  Pt denies F/C/D/C, no dysuria, burning on urination or blood in urine  History provided by:  Patient   used: No        Prior to Admission Medications   Prescriptions Last Dose Informant Patient Reported? Taking?   drospirenone-ethinyl estradiol (HAKEEM) 3-0 03 MG per tablet   No No   Sig: Take 1 tablet by mouth daily for 28 days   naproxen (EC NAPROSYN) 500 MG EC tablet Not Taking  No No   Sig: Take 1 tablet (500 mg total) by mouth 2 (two) times a day with meals   Patient not taking: Reported on 6/16/2023   norethindrone-ethinyl estradiol-iron (Loestrin Fe 1 5/30) 1 5-30 MG-MCG tablet Not Taking  No No   Sig: Take 1 tablet by mouth daily   Patient not taking: Reported on 5/17/2023   ondansetron (ZOFRAN-ODT) 4 mg disintegrating tablet   No No   Sig: Take 1 tablet (4 mg total) by mouth every 8 (eight) hours as needed for nausea for up to 10 days      Facility-Administered Medications: None       Past Medical History:   Diagnosis Date   • Anemia    • Seasonal allergic reaction        History reviewed  No pertinent surgical history  History reviewed  No pertinent family history  I have reviewed and agree with the history as documented      E-Cigarette/Vaping • E-Cigarette Use Never User      E-Cigarette/Vaping Substances   • Nicotine No    • THC No    • CBD No    • Flavoring No    • Other No    • Unknown No      Social History     Tobacco Use   • Smoking status: Never   • Smokeless tobacco: Never   Vaping Use   • Vaping Use: Never used   Substance Use Topics   • Alcohol use: Never   • Drug use: Not Currently     Types: Marijuana       Review of Systems   Constitutional: Negative for chills, fatigue and fever  HENT: Negative for dental problem  Eyes: Negative for visual disturbance  Respiratory: Positive for shortness of breath  Cardiovascular: Positive for chest pain  Gastrointestinal: Positive for nausea and vomiting  Negative for abdominal pain and diarrhea  Genitourinary: Negative for dysuria and frequency  Musculoskeletal: Negative for arthralgias  Skin: Negative for rash  Neurological: Negative for dizziness, weakness and light-headedness  Psychiatric/Behavioral: Negative for agitation, behavioral problems and confusion  All other systems reviewed and are negative  Physical Exam  Physical Exam  Vitals and nursing note reviewed  Constitutional:       Appearance: Normal appearance  HENT:      Head: Normocephalic and atraumatic  Eyes:      Extraocular Movements: Extraocular movements intact  Conjunctiva/sclera: Conjunctivae normal    Cardiovascular:      Rate and Rhythm: Normal rate and regular rhythm  Pulses: Normal pulses  Heart sounds: Normal heart sounds  Pulmonary:      Effort: Pulmonary effort is normal       Breath sounds: Normal breath sounds  Chest:      Chest wall: Tenderness present  Abdominal:      General: There is no distension  Musculoskeletal:         General: Normal range of motion  Cervical back: Normal range of motion  Skin:     Findings: No rash  Neurological:      General: No focal deficit present  Mental Status: She is alert  Cranial Nerves: No cranial nerve deficit  Psychiatric:         Mood and Affect: Mood normal          Vital Signs  ED Triage Vitals   Temperature Pulse Respirations Blood Pressure SpO2   06/16/23 0956 06/16/23 0930 06/16/23 0930 06/16/23 0930 06/16/23 0930   98 3 °F (36 8 °C) 68 16 143/80 100 %      Temp Source Heart Rate Source Patient Position - Orthostatic VS BP Location FiO2 (%)   06/16/23 0956 06/16/23 0930 06/16/23 0930 06/16/23 0930 --   Oral Monitor Sitting Right arm       Pain Score       06/16/23 0930       9           Vitals:    06/16/23 0930   BP: 143/80   Pulse: 68   Patient Position - Orthostatic VS: Sitting         Visual Acuity      ED Medications  Medications   ketorolac (TORADOL) injection 15 mg (15 mg Intravenous Given 6/16/23 1007)   ondansetron (ZOFRAN) injection 4 mg (4 mg Intravenous Given 6/16/23 1008)   albuterol inhalation solution 2 5 mg (2 5 mg Nebulization Given 6/16/23 1007)       Diagnostic Studies  Results Reviewed     None                 XR chest 2 views   ED Interpretation by Judy Woo MD (06/16 1110)   No PNA or PTX      Final Result by Orlando Duque MD (06/16 1133)      No acute cardiopulmonary disease  Workstation performed: [id number]                    Procedures  ECG 12 Lead Documentation Only    Date/Time: 6/16/2023 11:21 AM    Performed by: Judy Woo MD  Authorized by: Judy Woo MD    ECG reviewed by me, the ED Provider: yes    Patient location:  ED  Interpretation:     Interpretation: normal    Rate:     ECG rate:  63    ECG rate assessment: normal    Rhythm:     Rhythm: sinus rhythm    QRS:     QRS axis:  Normal    QRS intervals:  Normal  Conduction:     Conduction: normal    ST segments:     ST segments:  Normal  T waves:     T waves: normal               ED Course  ED Course as of 06/17/23 1841   Fri Jun 16, 2023   1110 Chest x-ray evaluated by me, interpretation:  No acute abnormalities     1111 ECG evaluated by myself, interpretation included in procedure "section of note  1128 Patient states improvement in nausea and discomfort, some chest pain does continue  CRAFFT    Flowsheet Row Most Recent Value   CRAFFT Initial Screen: During the past 12 months, did you:    1  Drink any alcohol (more than a few sips)? No Filed at: 06/16/2023 0933   2  Smoke any marijuana or hashish No Filed at: 06/16/2023 0933   3  Use anything else to get high? (\"anything else\" includes illegal drugs, over the counter and prescription drugs, and things that you sniff or 'khoury')? No Filed at: 06/16/2023 0933                                          Medical Decision Making  1  Chest pain - Patient with chest pain, vomiting  Seems to be MSK pain, will order ECG and check CXR, try Zofran for nausea, NSAIDs for discomfort  Will try a breathing Tx  Chest wall pain: acute illness or injury  Menstrual pain: acute illness or injury  Nausea: acute illness or injury  Amount and/or Complexity of Data Reviewed  Radiology: ordered and independent interpretation performed  Decision-making details documented in ED Course  ECG/medicine tests: ordered and independent interpretation performed  Decision-making details documented in ED Course  Risk  Prescription drug management  Disposition  Final diagnoses:   Chest wall pain   Menstrual pain   Nausea     Time reflects when diagnosis was documented in both MDM as applicable and the Disposition within this note     Time User Action Codes Description Comment    6/16/2023 11:29 AM Charles Star E Add [R07 89] Chest wall pain     6/16/2023 11:29 AM Charles Star E Add [N94 6] Menstrual pain     6/16/2023 11:29 AM Charles Star E Add [R11 0] Nausea       ED Disposition     ED Disposition   Discharge    Condition   Stable    Date/Time   Fri Jun 16, 2023 11:29 AM    Comment   Gil Castellon discharge to home/self care                 Follow-up Information     Follow up With Specialties Details Why Contact Info    Jonathan Gallagher PA-C Physician " Assistant   [address] [place] [address]  [phone]            Discharge Medication List as of 6/16/2023 11:29 AM      START taking these medications    Details   naproxen (Naprosyn) 500 mg tablet Take 1 tablet (500 mg total) by mouth 2 (two) times a day with meals, Starting Fri 6/16/2023, Normal         CONTINUE these medications which have CHANGED    Details   ondansetron (ZOFRAN-ODT) 4 mg disintegrating tablet Take 1 tablet (4 mg total) by mouth every 6 (six) hours as needed for nausea, Starting Fri 6/16/2023, Normal         CONTINUE these medications which have NOT CHANGED    Details   drospirenone-ethinyl estradiol (HAKEEM) 3-0 03 MG per tablet Take 1 tablet by mouth daily for 28 days, Starting Sun 2/26/2023, Until Sun 3/26/2023, Normal      naproxen (EC NAPROSYN) 500 MG EC tablet Take 1 tablet (500 mg total) by mouth 2 (two) times a day with meals, Starting Wed 5/17/2023, Until Thu 5/16/2024, Normal      norethindrone-ethinyl estradiol-iron (Loestrin Fe 1 5/30) 1 5-30 MG-MCG tablet Take 1 tablet by mouth daily, Starting Fri 1/6/2023, Normal             No discharge procedures on file      PDMP Review       Value Time User    PDMP Reviewed  Yes 1/31/2023  3:35 AM Joan Kelly MD          ED Provider  Electronically Signed by           Josephine Marie MD  06/17/23 8894

## 2023-06-16 NOTE — ED NOTES
Pt drinking some cold water, states she feels much better        [address] RN  06/16/23 6155

## 2023-06-16 NOTE — DISCHARGE INSTRUCTIONS
Take the Naprosyn twice daily for the next 5-10 days  Take the zofran as needed for nausea, this can be placed under the tongue to dissolve if you are vomiting

## 2023-06-16 NOTE — ED NOTES
Pt states she has her period and menstrual cramps  Her cramps always cause severe pain with nausea and vomiting  Has been vomiting since yesterday  Awoke at 730 this morning to chest pressure and SOB on exertion  Pt states she has no hx of asthma  Hx of anemia but has not taken any iron for 1 year  Heavy bleeding per patient  Vomiting in room now        Aidee Galo RN  06/16/23 9910